# Patient Record
Sex: FEMALE | Race: WHITE | NOT HISPANIC OR LATINO | Employment: FULL TIME | URBAN - METROPOLITAN AREA
[De-identification: names, ages, dates, MRNs, and addresses within clinical notes are randomized per-mention and may not be internally consistent; named-entity substitution may affect disease eponyms.]

---

## 2017-07-31 ENCOUNTER — OFFICE VISIT (OUTPATIENT)
Dept: ORTHOPEDIC SURGERY | Facility: CLINIC | Age: 52
End: 2017-07-31

## 2017-07-31 DIAGNOSIS — M17.0 PRIMARY OSTEOARTHRITIS OF BOTH KNEES: Primary | ICD-10-CM

## 2017-07-31 DIAGNOSIS — R52 PAIN: ICD-10-CM

## 2017-07-31 PROCEDURE — 20610 DRAIN/INJ JOINT/BURSA W/O US: CPT | Performed by: ORTHOPAEDIC SURGERY

## 2017-07-31 PROCEDURE — 99213 OFFICE O/P EST LOW 20 MIN: CPT | Performed by: ORTHOPAEDIC SURGERY

## 2017-07-31 RX ORDER — TRIAMCINOLONE ACETONIDE 40 MG/ML
40 INJECTION, SUSPENSION INTRA-ARTICULAR; INTRAMUSCULAR
Status: COMPLETED | OUTPATIENT
Start: 2017-07-31 | End: 2017-07-31

## 2017-07-31 RX ORDER — LIDOCAINE HYDROCHLORIDE 10 MG/ML
8 INJECTION, SOLUTION EPIDURAL; INFILTRATION; INTRACAUDAL; PERINEURAL
Status: COMPLETED | OUTPATIENT
Start: 2017-07-31 | End: 2017-07-31

## 2017-07-31 RX ADMIN — LIDOCAINE HYDROCHLORIDE 8 ML: 10 INJECTION, SOLUTION EPIDURAL; INFILTRATION; INTRACAUDAL; PERINEURAL at 13:43

## 2017-07-31 RX ADMIN — TRIAMCINOLONE ACETONIDE 40 MG: 40 INJECTION, SUSPENSION INTRA-ARTICULAR; INTRAMUSCULAR at 13:43

## 2017-07-31 NOTE — PROGRESS NOTES
Subjective: Bilateral knee pain     Patient ID: Sharonda Preciado is a 51 y.o. female.    Chief Complaint:    History of Present Illness 51-year-old female returns once again with both knee symptomatic.  Was seen last December which time she underwent bilateral cortisone injections into her knees and did well to the past 4-6 weeks.  She is not complaining of pain with activities of daily living.  Pain getting in and out of a chair and navigating steps and even some just with normal activity.  Patient returns to see if she is a candidate for repeat injections or to be needed proceed with some other form of treatment IV viscus supplement injections.       Social History     Occupational History   • Not on file.     Social History Main Topics   • Smoking status: Never Smoker   • Smokeless tobacco: Not on file   • Alcohol use No   • Drug use: No   • Sexual activity: No      Review of Systems   Constitutional: Negative for chills, diaphoresis, fever and unexpected weight change.   HENT: Negative for hearing loss, nosebleeds, sore throat and tinnitus.    Eyes: Negative for pain and visual disturbance.   Respiratory: Negative for cough, shortness of breath and wheezing.    Cardiovascular: Negative for chest pain and palpitations.   Gastrointestinal: Negative for abdominal pain, diarrhea, nausea and vomiting.   Endocrine: Negative for cold intolerance, heat intolerance and polydipsia.   Genitourinary: Negative for difficulty urinating, dysuria and hematuria.   Musculoskeletal: Positive for arthralgias. Negative for joint swelling and myalgias.   Skin: Negative for rash and wound.   Allergic/Immunologic: Negative for environmental allergies.   Neurological: Negative for dizziness, syncope and numbness.   Hematological: Does not bruise/bleed easily.   Psychiatric/Behavioral: Negative for dysphoric mood and sleep disturbance. The patient is not nervous/anxious.    All other systems reviewed and are negative.        No past medical  history on file.  No past surgical history on file.  No family history on file.      Objective:  There were no vitals filed for this visit.  There were no vitals filed for this visit.  There is no height or weight on file to calculate BMI.       Ortho Exam  she is alert and oriented ×3.  Both knee show no swelling effusion erythema.  There is 0-120° of motion with crepitus throughout the arc of motion but no instability.  Quad function is 5 over 5.  Calves are nontender with negative Homans.  Skin is cool to touch.  She has been taking naproxen twice a day with no GI side effects is not resolving her all of her pain and discomfort.  The pain now again is beginning to interfere with activity daily living.    Assessment:       1. Primary osteoarthritis of both knees    2. Pain          Plan:      as it has been more than 6 months since her last injection on a repeat injections in the both knees.  Sterile prep and underwent injection 8 cc lidocaine 1 cc Kenalog through the superior lateral portal without complications.  Post injection instructions given to the patient.  Return to see me as needed again is a emphasized as listed 5 months we'll proceed with viscus supplement injections in its more than 5-6 months repeat cortisone discussed this with the patient and she is in a great      Work Status:    SEBASTIAN query complete.    Orders:  Orders Placed This Encounter   Procedures   • Large Joint Arthrocentesis       Medications:  No orders of the defined types were placed in this encounter.      Followup:  Return if symptoms worsen or fail to improve.        Large Joint Arthrocentesis  Date/Time: 7/31/2017 1:43 PM  Consent given by: patient  Site marked: site marked  Timeout: Immediately prior to procedure a time out was called to verify the correct patient, procedure, equipment, support staff and site/side marked as required   Supporting Documentation  Indications: pain   Procedure Details  Location: knee - Knee joint:  bilateral.  Preparation: Patient was prepped and draped in the usual sterile fashion  Needle size: 22 G  Medications administered: 8 mL lidocaine PF 1% 1 %; 40 mg triamcinolone acetonide 40 MG/ML  Patient tolerance: patient tolerated the procedure well with no immediate complications        Dragon transcription disclaimer     Much of this encounter note is an electronic transcription/translation of spoken language to printed text. The electronic translation of spoken language may permit erroneous, or at times, nonsensical words or phrases to be inadvertently transcribed. Although I have reviewed the note for such errors, some may still exist.

## 2018-02-14 ENCOUNTER — OFFICE VISIT (OUTPATIENT)
Dept: ORTHOPEDIC SURGERY | Facility: CLINIC | Age: 53
End: 2018-02-14

## 2018-02-14 DIAGNOSIS — M17.0 PRIMARY OSTEOARTHRITIS OF BOTH KNEES: Primary | ICD-10-CM

## 2018-02-14 PROCEDURE — 20610 DRAIN/INJ JOINT/BURSA W/O US: CPT | Performed by: ORTHOPAEDIC SURGERY

## 2018-02-14 PROCEDURE — 99213 OFFICE O/P EST LOW 20 MIN: CPT | Performed by: ORTHOPAEDIC SURGERY

## 2018-02-14 RX ORDER — LIDOCAINE HYDROCHLORIDE 10 MG/ML
8 INJECTION, SOLUTION EPIDURAL; INFILTRATION; INTRACAUDAL; PERINEURAL
Status: COMPLETED | OUTPATIENT
Start: 2018-02-14 | End: 2018-02-14

## 2018-02-14 RX ORDER — TRIAMCINOLONE ACETONIDE 40 MG/ML
40 INJECTION, SUSPENSION INTRA-ARTICULAR; INTRAMUSCULAR
Status: COMPLETED | OUTPATIENT
Start: 2018-02-14 | End: 2018-02-14

## 2018-02-14 RX ORDER — SERTRALINE HYDROCHLORIDE 100 MG/1
100 TABLET, FILM COATED ORAL
COMMUNITY
Start: 2017-07-21 | End: 2018-07-21

## 2018-02-14 RX ADMIN — TRIAMCINOLONE ACETONIDE 40 MG: 40 INJECTION, SUSPENSION INTRA-ARTICULAR; INTRAMUSCULAR at 08:41

## 2018-02-14 RX ADMIN — LIDOCAINE HYDROCHLORIDE 8 ML: 10 INJECTION, SOLUTION EPIDURAL; INFILTRATION; INTRACAUDAL; PERINEURAL at 08:41

## 2018-02-14 NOTE — PROGRESS NOTES
Subjective: Bilateral knee pain     Patient ID: Sharonda Preciado is a 52 y.o. female.    Chief Complaint:    History of Present Illness 52-year-old female is seen once again for bilateral knee pain.  Was seen last July and underwent cortisone injection in both knees and did well for about 5 maybe 6 months.  She is now beginning to have pain and discomfort once again on a daily basis particularly getting in and out of a chair and navigating Even Some Pain at Rest.  Describes the Pain at Rest As a 5 or 6 and with Activity 7 or 8.  She Has Been Tolerating the Naproxen without any GI side effects.       Social History     Occupational History   • Not on file.     Social History Main Topics   • Smoking status: Never Smoker   • Smokeless tobacco: Not on file   • Alcohol use No   • Drug use: No   • Sexual activity: No      Review of Systems   Constitutional: Negative for chills, diaphoresis, fever and unexpected weight change.   HENT: Negative for hearing loss, nosebleeds, sore throat and tinnitus.    Eyes: Negative for pain and visual disturbance.   Respiratory: Negative for cough, shortness of breath and wheezing.    Cardiovascular: Negative for chest pain and palpitations.   Gastrointestinal: Negative for abdominal pain, diarrhea, nausea and vomiting.   Endocrine: Negative for cold intolerance, heat intolerance and polydipsia.   Genitourinary: Negative for difficulty urinating, dysuria and hematuria.   Musculoskeletal: Positive for arthralgias. Negative for joint swelling and myalgias.   Skin: Negative for rash and wound.   Allergic/Immunologic: Negative for environmental allergies.   Neurological: Negative for dizziness, syncope and numbness.   Hematological: Does not bruise/bleed easily.   Psychiatric/Behavioral: Negative for dysphoric mood and sleep disturbance. The patient is not nervous/anxious.          No past medical history on file.  No past surgical history on file.  No family history on  file.      Objective:  There were no vitals filed for this visit.  There were no vitals filed for this visit.  There is no height or weight on file to calculate BMI.       Ortho Exam  she is alert and oriented ×3.  She is classified as a class III obese female BMI 49.  Neither knee has an effusion swelling erythema.  There is moderate crepitus with range of motion which is 0-120° but there is no instability throughout the arc of motion no patella subluxation.  Quad function is 5 over 5 in her calf is nontender negative Homans.  There is no sensory loss no motor deficit with good distal pulses.  The skin is cool to touch.  His been tolerating the naproxen without any GI side effects her discomfort.    Assessment:       1. Primary osteoarthritis of both knees          Plan:      over 10 minutes was spent discussing treatment options at this time.  Tolerating the naproxen without any GI side effects.  She had a good response to cortisone injection that lasted for over 5 months.  At this time discuss repeat cortisone injection versus viscus supplement injections versus physical therapy versus change in the anti-inflammatories.  She had a good response previously the patient wants to proceed with repeat cortisone injection which I do think is viable option.  She underwent therefore bilateral knee injections with 8 cc lidocaine 1 cc Kenalog through the superior lateral portal about sterile prep without complications tolerating it well.  Postinjection instructions given to the patient.  Return to see me as needed and again discussed with the patient timing of return will dictate repeat cortisone possibly or viscus supplement injections.  She was in agreement      Work Status:    SEBASTIAN query complete.    Orders:  Orders Placed This Encounter   Procedures   • Large Joint Arthrocentesis       Medications:  New Medications Ordered This Visit   Medications   • sertraline (ZOLOFT) 100 MG tablet     Sig: Take 100 mg by mouth.        Followup:  Return if symptoms worsen or fail to improve.          Dragon transcription disclaimer     Much of this encounter note is an electronic transcription/translation of spoken language to printed text. The electronic translation of spoken language may permit erroneous, or at times, nonsensical words or phrases to be inadvertently transcribed. Although I have reviewed the note for such errors, some may still exist.  Large Joint Arthrocentesis  Date/Time: 2/14/2018 8:41 AM  Consent given by: patient  Site marked: site marked  Timeout: Immediately prior to procedure a time out was called to verify the correct patient, procedure, equipment, support staff and site/side marked as required   Supporting Documentation  Indications: pain   Procedure Details  Location: knee - Knee joint: bilateral.  Preparation: Patient was prepped and draped in the usual sterile fashion  Needle size: 22 G  Approach: anterolateral  Medications administered: 8 mL lidocaine PF 1% 1 %; 40 mg triamcinolone acetonide 40 MG/ML  Patient tolerance: patient tolerated the procedure well with no immediate complications

## 2018-07-26 ENCOUNTER — OFFICE VISIT (OUTPATIENT)
Dept: ORTHOPEDIC SURGERY | Facility: CLINIC | Age: 53
End: 2018-07-26

## 2018-07-26 DIAGNOSIS — R52 PAIN: Primary | ICD-10-CM

## 2018-07-26 DIAGNOSIS — M17.0 PRIMARY OSTEOARTHRITIS OF BOTH KNEES: ICD-10-CM

## 2018-07-26 PROCEDURE — 20610 DRAIN/INJ JOINT/BURSA W/O US: CPT | Performed by: ORTHOPAEDIC SURGERY

## 2018-07-26 RX ORDER — TRIAMCINOLONE ACETONIDE 40 MG/ML
40 INJECTION, SUSPENSION INTRA-ARTICULAR; INTRAMUSCULAR
Status: COMPLETED | OUTPATIENT
Start: 2018-07-26 | End: 2018-07-26

## 2018-07-26 RX ORDER — LIDOCAINE HYDROCHLORIDE 10 MG/ML
4 INJECTION, SOLUTION EPIDURAL; INFILTRATION; INTRACAUDAL; PERINEURAL
Status: COMPLETED | OUTPATIENT
Start: 2018-07-26 | End: 2018-07-26

## 2018-07-26 RX ADMIN — TRIAMCINOLONE ACETONIDE 40 MG: 40 INJECTION, SUSPENSION INTRA-ARTICULAR; INTRAMUSCULAR at 14:57

## 2018-07-26 RX ADMIN — LIDOCAINE HYDROCHLORIDE 4 ML: 10 INJECTION, SOLUTION EPIDURAL; INFILTRATION; INTRACAUDAL; PERINEURAL at 14:57

## 2018-07-26 NOTE — PROGRESS NOTES
Subjective: Bilateral knee pain     Patient ID: Sharonda Preciado is a 52 y.o. female.    Chief Complaint:    History of Present Illness patient seen once again with bilateral knee pain.  Cortisone injection lasted for about 3-3-1/2 months       Social History     Occupational History   • Not on file.     Social History Main Topics   • Smoking status: Never Smoker   • Smokeless tobacco: Not on file   • Alcohol use No   • Drug use: No   • Sexual activity: No      Review of Systems   Constitutional: Negative for chills, diaphoresis, fever and unexpected weight change.   HENT: Negative for hearing loss, nosebleeds, sore throat and tinnitus.    Eyes: Negative for pain and visual disturbance.   Respiratory: Negative for cough, shortness of breath and wheezing.    Cardiovascular: Negative for chest pain and palpitations.   Gastrointestinal: Negative for abdominal pain, diarrhea, nausea and vomiting.   Endocrine: Negative for cold intolerance, heat intolerance and polydipsia.   Genitourinary: Negative for difficulty urinating, dysuria and hematuria.   Musculoskeletal: Positive for arthralgias. Negative for joint swelling and myalgias.   Skin: Negative for rash and wound.   Allergic/Immunologic: Negative for environmental allergies.   Neurological: Negative for dizziness, syncope and numbness.   Hematological: Does not bruise/bleed easily.   Psychiatric/Behavioral: Negative for dysphoric mood and sleep disturbance. The patient is not nervous/anxious.          No past medical history on file.  No past surgical history on file.  No family history on file.      Objective:  There were no vitals filed for this visit.  There were no vitals filed for this visit.  There is no height or weight on file to calculate BMI.       Ortho Exam  she is alert and oriented ×3.  Again marked pain and discomfort with range of motion but no instability.    Assessment:       1. Pain    2. Primary osteoarthritis of both knees          Plan: Discussed  treatment options patient as far as repeat cortisone injection or proceed with viscus supplement injections.  She is getting ready to go on a trip that she like to have cortisone shot today and then in in September October proceed with viscus supplement injections.  Therefore both knees were injected 4 cc lidocaine 1 cc Kenalog through the superolateral portal about sterile prep without complications tolerating that well.  Return in September October to begin the viscus supplement injections release to begin the authorization process      Large Joint Arthrocentesis  Date/Time: 7/26/2018 2:57 PM  Consent given by: patient  Site marked: site marked  Timeout: Immediately prior to procedure a time out was called to verify the correct patient, procedure, equipment, support staff and site/side marked as required   Supporting Documentation  Indications: pain   Procedure Details  Location: knee - Knee joint: bilateral.  Preparation: Patient was prepped and draped in the usual sterile fashion  Needle size: 22 G  Approach: anteromedial  Medications administered: 4 mL lidocaine PF 1% 1 %; 40 mg triamcinolone acetonide 40 MG/ML  Patient tolerance: patient tolerated the procedure well with no immediate complications              Work Status:    SEBASTIAN query complete.    Orders:  Orders Placed This Encounter   Procedures   • Large Joint Arthrocentesis       Medications:  No orders of the defined types were placed in this encounter.      Followup:  Return in about 3 months (around 10/26/2018).          Dragon transcription disclaimer     Much of this encounter note is an electronic transcription/translation of spoken language to printed text. The electronic translation of spoken language may permit erroneous, or at times, nonsensical words or phrases to be inadvertently transcribed. Although I have reviewed the note for such errors, some may still exist.

## 2018-10-10 ENCOUNTER — OFFICE VISIT (OUTPATIENT)
Dept: ORTHOPEDIC SURGERY | Facility: CLINIC | Age: 53
End: 2018-10-10

## 2018-10-10 VITALS — WEIGHT: 260 LBS | HEIGHT: 62 IN | BODY MASS INDEX: 47.84 KG/M2

## 2018-10-10 DIAGNOSIS — M17.0 PRIMARY OSTEOARTHRITIS OF BOTH KNEES: Primary | ICD-10-CM

## 2018-10-10 PROCEDURE — 99212 OFFICE O/P EST SF 10 MIN: CPT | Performed by: ORTHOPAEDIC SURGERY

## 2018-10-10 NOTE — PROGRESS NOTES
Subjective: Knee pain     Patient ID: Sharonda Preciado is a 52 y.o. female.    Chief Complaint:    History of Present Illness 52-year-old female referred knees once again symptomatic.  The last series of cortisone injection lasted only for a couple months she is again having moderate pain and discomfort all activities of daily living.  Anti-inflammatories also ineffective as far as relieving the patient of discomfort       Social History     Occupational History   • Not on file.     Social History Main Topics   • Smoking status: Never Smoker   • Smokeless tobacco: Never Used   • Alcohol use No   • Drug use: No   • Sexual activity: No      Review of Systems   Constitutional: Negative for chills, diaphoresis, fever and unexpected weight change.   HENT: Negative for hearing loss, nosebleeds, sore throat and tinnitus.    Eyes: Negative for pain and visual disturbance.   Respiratory: Negative for cough, shortness of breath and wheezing.    Cardiovascular: Negative for chest pain and palpitations.   Gastrointestinal: Negative for abdominal pain, diarrhea, nausea and vomiting.   Endocrine: Negative for cold intolerance, heat intolerance and polydipsia.   Genitourinary: Negative for difficulty urinating, dysuria and hematuria.   Musculoskeletal: Positive for arthralgias and myalgias. Negative for joint swelling.   Skin: Negative for rash and wound.   Allergic/Immunologic: Negative for environmental allergies.   Neurological: Negative for dizziness, syncope and numbness.   Hematological: Does not bruise/bleed easily.   Psychiatric/Behavioral: Negative for dysphoric mood and sleep disturbance. The patient is not nervous/anxious.          History reviewed. No pertinent past medical history.  History reviewed. No pertinent surgical history.  History reviewed. No pertinent family history.      Objective:  There were no vitals filed for this visit.  1    10/10/18  0952   Weight: 118 kg (260 lb)     Body mass index is 47.55  kg/m².        Ortho Exam   is alert and oriented ×3.  Neither knee shows no swelling effusion erythema.  She has good distal pulses no motor sensory deficit.  There is pain and crepitus with range of motion but there is no instability.  Quad function 5 over 5 in the calf remains nontender    Assessment:        1. Primary osteoarthritis of both knees           Plan: As cortisone is no longer effective for her to contact insurance carrier regarding viscus supplement injections.  Discussed the rationale behind the use of the gel injections.  Return to begin those once authorization has been obtained            Work Status:    SEBASTIAN query complete.    Orders:  Orders Placed This Encounter   Procedures   • Visco Treatment       Medications:  No orders of the defined types were placed in this encounter.      Followup:  Return in about 2 weeks (around 10/24/2018).          Dictated utilizing Dragon dictation

## 2018-11-08 ENCOUNTER — TELEPHONE (OUTPATIENT)
Dept: ORTHOPEDIC SURGERY | Facility: CLINIC | Age: 53
End: 2018-11-08

## 2018-11-16 ENCOUNTER — TELEPHONE (OUTPATIENT)
Dept: ORTHOPEDIC SURGERY | Facility: CLINIC | Age: 53
End: 2018-11-16

## 2018-11-16 NOTE — TELEPHONE ENCOUNTER
LEFT MESSAGE FOR PATIENT TO CALL BACK.    THE RX WAS FAXED TO [facility] ON 11.7.18    I AM NOT AWARE OF WHICH SPECIALTY PHARMACY HER INSURANCE IS USING, DUE TO HER INSURANCE BEING [facility].     I FAXED HER VISCO INFO TO [facility]'S FAX NUMBER.     IF PATIENT WOULD LIKE TO CALL [facility], SHE WOULD JUST NEED TO CALL HER INSURANCE AND GET TRANSFERRED TO THE PHARMACY.     11.29.18  Tried to call patient to set up bilateral orthovisc.     No copay, needs to come in before january

## 2018-11-16 NOTE — TELEPHONE ENCOUNTER
Patient is calling wanting to schedule for  visco injections in juan knee. Your note in the chart said Authorization was approved and was faxed along with prescription to pharmacy on 11/08/18. I let patient know.  Can you check which pharmacy and call patient with contact number? She would like to call them to consent for shipping.

## 2018-12-03 ENCOUNTER — CLINICAL SUPPORT (OUTPATIENT)
Dept: ORTHOPEDIC SURGERY | Facility: CLINIC | Age: 53
End: 2018-12-03

## 2018-12-03 VITALS — BODY MASS INDEX: 46.01 KG/M2 | WEIGHT: 250 LBS | HEIGHT: 62 IN

## 2018-12-03 DIAGNOSIS — M17.0 PRIMARY OSTEOARTHRITIS OF BOTH KNEES: Primary | ICD-10-CM

## 2018-12-03 PROCEDURE — 20610 DRAIN/INJ JOINT/BURSA W/O US: CPT | Performed by: ORTHOPAEDIC SURGERY

## 2018-12-03 NOTE — PROGRESS NOTES
Subjective: Osteoarthritis both knees     Patient ID: Sharonda Preciado is a 53 y.o. female.    Chief Complaint:    History of Present Illness patient seen for first Orthovisc injection into both knees       Social History     Occupational History   • Not on file   Tobacco Use   • Smoking status: Never Smoker   • Smokeless tobacco: Never Used   Substance and Sexual Activity   • Alcohol use: No   • Drug use: No   • Sexual activity: No      Review of Systems   Constitutional: Negative for chills, diaphoresis, fever and unexpected weight change.   HENT: Negative for hearing loss, nosebleeds, sore throat and tinnitus.    Eyes: Negative for pain and visual disturbance.   Respiratory: Negative for cough, shortness of breath and wheezing.    Cardiovascular: Negative for chest pain and palpitations.   Gastrointestinal: Negative for abdominal pain, diarrhea, nausea and vomiting.   Endocrine: Negative for cold intolerance, heat intolerance and polydipsia.   Genitourinary: Negative for difficulty urinating, dysuria and hematuria.   Musculoskeletal: Positive for arthralgias and myalgias. Negative for joint swelling.   Skin: Negative for rash and wound.   Allergic/Immunologic: Negative for environmental allergies.   Neurological: Negative for dizziness, syncope and numbness.   Hematological: Does not bruise/bleed easily.   Psychiatric/Behavioral: Negative for dysphoric mood and sleep disturbance. The patient is not nervous/anxious.          History reviewed. No pertinent past medical history.  History reviewed. No pertinent surgical history.  History reviewed. No pertinent family history.      Objective:  There were no vitals filed for this visit.      12/03/18  0818   Weight: 113 kg (250 lb)     Body mass index is 45.73 kg/m².        Ortho Exam   2 ML injection given the each knee through the superolateral portal about sterile prep without, complications tolerating it well.  Postinjection instructions given to the  patient.    Assessment:        1. Primary osteoarthritis of both knees           Plan: Return next week for her next injection  Large Joint Arthrocentesis  Date/Time: 12/3/2018 8:19 AM  Consent given by: patient  Site marked: site marked  Timeout: Immediately prior to procedure a time out was called to verify the correct patient, procedure, equipment, support staff and site/side marked as required   Supporting Documentation  Indications: pain   Procedure Details  Location: knee - Knee joint: BILATERAL KNEE.  Preparation: Patient was prepped and draped in the usual sterile fashion  Needle size: 22 G  Approach: anterolateral  Medications administered: 30 mg Hyaluronan 30 MG/2ML  Patient tolerance: patient tolerated the procedure well with no immediate complications      BILATERAL ORTHOVISC PROVIDED VIA [facility].          Work Status:    SEBASTIAN query complete.    Orders:  Orders Placed This Encounter   Procedures   • Large Joint Arthrocentesis       Medications:  No orders of the defined types were placed in this encounter.      Followup:  Return in about 1 week (around 12/10/2018).          Dictated utilizing Dragon dictation

## 2018-12-10 ENCOUNTER — CLINICAL SUPPORT (OUTPATIENT)
Dept: ORTHOPEDIC SURGERY | Facility: CLINIC | Age: 53
End: 2018-12-10

## 2018-12-10 DIAGNOSIS — M17.0 PRIMARY OSTEOARTHRITIS OF BOTH KNEES: ICD-10-CM

## 2018-12-10 DIAGNOSIS — R52 PAIN: Primary | ICD-10-CM

## 2018-12-10 PROCEDURE — 20610 DRAIN/INJ JOINT/BURSA W/O US: CPT | Performed by: ORTHOPAEDIC SURGERY

## 2018-12-10 NOTE — PROGRESS NOTES
Subjective: Osteoarthritis both knees     Patient ID: Sharonda Preciado is a 53 y.o. female.    Chief Complaint:    History of Present Illness patient seen for second Orthovisc injection.  Has noted slight improvement following the first injection       Social History     Occupational History   • Not on file   Tobacco Use   • Smoking status: Never Smoker   • Smokeless tobacco: Never Used   Substance and Sexual Activity   • Alcohol use: No   • Drug use: No   • Sexual activity: No      Review of Systems   Constitutional: Negative for chills, diaphoresis, fever and unexpected weight change.   HENT: Negative for hearing loss, nosebleeds, sore throat and tinnitus.    Eyes: Negative for pain and visual disturbance.   Respiratory: Negative for cough, shortness of breath and wheezing.    Cardiovascular: Negative for chest pain and palpitations.   Gastrointestinal: Negative for abdominal pain, diarrhea, nausea and vomiting.   Endocrine: Negative for cold intolerance, heat intolerance and polydipsia.   Genitourinary: Negative for difficulty urinating, dysuria and hematuria.   Musculoskeletal: Positive for arthralgias. Negative for joint swelling and myalgias.   Skin: Negative for rash and wound.   Allergic/Immunologic: Negative for environmental allergies.   Neurological: Negative for dizziness, syncope and numbness.   Hematological: Does not bruise/bleed easily.   Psychiatric/Behavioral: Negative for dysphoric mood and sleep disturbance. The patient is not nervous/anxious.    All other systems reviewed and are negative.        No past medical history on file.  No past surgical history on file.  No family history on file.      Objective:  There were no vitals filed for this visit.  There were no vitals filed for this visit.  There is no height or weight on file to calculate BMI.        Ortho Exam   2 ML injection given in each portal about sterile prep without complications tolerating that well.  Postinjection instructions given  to the patient     Assessment:        1. Pain    2. Primary osteoarthritis of both knees           Plan: Return next week for final injection      Large Joint Arthrocentesis  Date/Time: 12/10/2018 8:22 AM  Consent given by: patient  Site marked: site marked  Timeout: Immediately prior to procedure a time out was called to verify the correct patient, procedure, equipment, support staff and site/side marked as required   Supporting Documentation  Indications: pain   Procedure Details  Location: knee - Knee joint: BILATERAL.  Preparation: Patient was prepped and draped in the usual sterile fashion  Needle size: 22 G  Approach: anterolateral  Medications administered: 30 mg Hyaluronan 30 MG/2ML  Patient tolerance: patient tolerated the procedure well with no immediate complications            Work Status:    SEBASTIAN query complete.    Orders:  Orders Placed This Encounter   Procedures   • Large Joint Arthrocentesis       Medications:  No orders of the defined types were placed in this encounter.      Followup:  Return in about 1 week (around 12/17/2018).          Dictated utilizing Dragon dictation

## 2018-12-17 ENCOUNTER — CLINICAL SUPPORT (OUTPATIENT)
Dept: ORTHOPEDIC SURGERY | Facility: CLINIC | Age: 53
End: 2018-12-17

## 2018-12-17 VITALS — HEIGHT: 62 IN | WEIGHT: 250 LBS | BODY MASS INDEX: 46.01 KG/M2

## 2018-12-17 DIAGNOSIS — M17.0 PRIMARY OSTEOARTHRITIS OF BOTH KNEES: Primary | ICD-10-CM

## 2018-12-17 PROCEDURE — 20610 DRAIN/INJ JOINT/BURSA W/O US: CPT | Performed by: ORTHOPAEDIC SURGERY

## 2018-12-17 NOTE — PROGRESS NOTES
Subjective: Osteoarthritis both knees     Patient ID: Sharonda Preciado is a 53 y.o. female.    Chief Complaint:    History of Present Illness  Patient seen for final Orthovisc injection into both knees     Social History     Occupational History   • Not on file   Tobacco Use   • Smoking status: Never Smoker   • Smokeless tobacco: Never Used   Substance and Sexual Activity   • Alcohol use: No   • Drug use: No   • Sexual activity: No      Review of Systems   Constitutional: Negative for appetite change, chills, diaphoresis, fever and unexpected weight change.   HENT: Negative for hearing loss, nosebleeds, sore throat and tinnitus.    Eyes: Negative for pain and visual disturbance.   Respiratory: Negative for cough, shortness of breath and wheezing.    Cardiovascular: Negative for chest pain and palpitations.   Gastrointestinal: Negative for abdominal pain, diarrhea, nausea and vomiting.   Endocrine: Negative for cold intolerance, heat intolerance and polydipsia.   Genitourinary: Negative for difficulty urinating, dysuria and hematuria.   Musculoskeletal: Positive for arthralgias and myalgias. Negative for joint swelling.   Skin: Negative for rash and wound.   Allergic/Immunologic: Negative for environmental allergies.   Neurological: Negative for dizziness, syncope and numbness.   Hematological: Does not bruise/bleed easily.   Psychiatric/Behavioral: Negative for dysphoric mood and sleep disturbance. The patient is not nervous/anxious.          History reviewed. No pertinent past medical history.  History reviewed. No pertinent surgical history.  History reviewed. No pertinent family history.      Objective:  There were no vitals filed for this visit.      12/17/18  0850   Weight: 113 kg (250 lb)     Body mass index is 45.73 kg/m².        Ortho Exam   final 2 ML injection given in the each knee through the superolateral portal sterile prep without, complications tolerating well.  Postinjection instructions given to the  patient.    Assessment:        1. Primary osteoarthritis of both knees           Plan: Return in 4 weeks if still symptomatic otherwise when necessary  Large Joint Arthrocentesis  Date/Time: 12/17/2018 8:51 AM  Consent given by: patient  Site marked: site marked  Timeout: Immediately prior to procedure a time out was called to verify the correct patient, procedure, equipment, support staff and site/side marked as required   Supporting Documentation  Indications: pain   Procedure Details  Location: knee - Knee joint: BILATERAL KNEE.  Preparation: Patient was prepped and draped in the usual sterile fashion  Needle size: 22 G  Approach: anterolateral  Medications administered: 30 mg Hyaluronan 30 MG/2ML  Patient tolerance: patient tolerated the procedure well with no immediate complications      BILATERAL ORTHOVISC PROVIDED VIA [facility].          Work Status:    SEBASTIAN query complete.    Orders:  Orders Placed This Encounter   Procedures   • Large Joint Arthrocentesis       Medications:  No orders of the defined types were placed in this encounter.      Followup:  Return in about 4 weeks (around 1/14/2019).          Dictated utilizing Dragon dictation

## 2019-04-04 ENCOUNTER — OFFICE VISIT (OUTPATIENT)
Dept: ORTHOPEDIC SURGERY | Facility: CLINIC | Age: 54
End: 2019-04-04

## 2019-04-04 VITALS — WEIGHT: 253.2 LBS | BODY MASS INDEX: 46.31 KG/M2

## 2019-04-04 DIAGNOSIS — E66.01 MORBID OBESITY WITH BMI OF 45.0-49.9, ADULT (HCC): ICD-10-CM

## 2019-04-04 DIAGNOSIS — R52 PAIN: Primary | ICD-10-CM

## 2019-04-04 DIAGNOSIS — M17.0 PRIMARY OSTEOARTHRITIS OF BOTH KNEES: ICD-10-CM

## 2019-04-04 PROCEDURE — 20610 DRAIN/INJ JOINT/BURSA W/O US: CPT | Performed by: ORTHOPAEDIC SURGERY

## 2019-04-04 PROCEDURE — 99214 OFFICE O/P EST MOD 30 MIN: CPT | Performed by: ORTHOPAEDIC SURGERY

## 2019-04-04 RX ORDER — LIDOCAINE HYDROCHLORIDE 10 MG/ML
4 INJECTION, SOLUTION EPIDURAL; INFILTRATION; INTRACAUDAL; PERINEURAL
Status: COMPLETED | OUTPATIENT
Start: 2019-04-04 | End: 2019-04-04

## 2019-04-04 RX ORDER — TRIAMCINOLONE ACETONIDE 40 MG/ML
40 INJECTION, SUSPENSION INTRA-ARTICULAR; INTRAMUSCULAR
Status: COMPLETED | OUTPATIENT
Start: 2019-04-04 | End: 2019-04-04

## 2019-04-04 RX ORDER — SERTRALINE HYDROCHLORIDE 100 MG/1
TABLET, FILM COATED ORAL
COMMUNITY
Start: 2019-03-11 | End: 2020-11-18

## 2019-04-04 RX ORDER — LEVOTHYROXINE SODIUM 0.12 MG/1
TABLET ORAL
COMMUNITY
Start: 2019-03-11 | End: 2023-03-09 | Stop reason: SDUPTHER

## 2019-04-04 RX ADMIN — LIDOCAINE HYDROCHLORIDE 4 ML: 10 INJECTION, SOLUTION EPIDURAL; INFILTRATION; INTRACAUDAL; PERINEURAL at 08:18

## 2019-04-04 RX ADMIN — TRIAMCINOLONE ACETONIDE 40 MG: 40 INJECTION, SUSPENSION INTRA-ARTICULAR; INTRAMUSCULAR at 08:18

## 2019-04-04 NOTE — PROGRESS NOTES
Subjective: Bilateral knee pain, morbid obesity     Patient ID: Sharonda Preciado is a 53 y.o. female.    Chief Complaint:    History of Present Illness 53-year-old female returns after having completed the viscous supplement injections in December.  Noted just marginal relief once again having significant pain and discomfort in both knees presents for further evaluation.  Patient is failed to respond to anti-inflammatories cortisone injection and Visco supplement injections and she wishes to discuss total joint replacement.       Social History     Occupational History   • Not on file   Tobacco Use   • Smoking status: Never Smoker   • Smokeless tobacco: Never Used   Substance and Sexual Activity   • Alcohol use: No   • Drug use: No   • Sexual activity: No      Review of Systems   Constitutional: Negative for chills, diaphoresis, fever and unexpected weight change.   HENT: Negative for hearing loss, nosebleeds, sore throat and tinnitus.    Eyes: Negative for pain and visual disturbance.   Respiratory: Negative for cough, shortness of breath and wheezing.    Cardiovascular: Negative for chest pain and palpitations.   Gastrointestinal: Negative for abdominal pain, diarrhea, nausea and vomiting.   Endocrine: Negative for cold intolerance, heat intolerance and polydipsia.   Genitourinary: Negative for difficulty urinating, dysuria and hematuria.   Musculoskeletal: Positive for arthralgias. Negative for joint swelling and myalgias.   Skin: Negative for rash and wound.   Allergic/Immunologic: Negative for environmental allergies.   Neurological: Negative for dizziness, syncope and numbness.   Hematological: Does not bruise/bleed easily.   Psychiatric/Behavioral: Negative for dysphoric mood and sleep disturbance. The patient is not nervous/anxious.    All other systems reviewed and are negative.        No past medical history on file.  No past surgical history on file.  No family history on file.      Objective:  There were no  vitals filed for this visit.      04/04/19  0813   Weight: 115 kg (253 lb 3.2 oz)     Body mass index is 46.31 kg/m².        Ortho Exam   She is alert and oriented x3.  Neither knee shows any swelling effusion erythema but there is crepitus with range of motion to both knees but no instability.  Joint line tenderness but negative Darrell's.  Quad function 5/5.  No varus or valgus instability at 0 30 degrees no instability at 0 90 degrees.  Skin is cool to touch.  Calf is nontender.  Good distal pulses no motor sensory deficit.  Patient was we weighed and her weight now is 253 and her BMI is increased from 45-46.3.    Assessment:        1. Pain    2. Primary osteoarthritis of both knees    3. Morbid obesity with BMI of 45.0-49.9, adult (CMS/[facility])           Plan: Over 15 minutes was spent with the patient discussing her treatment going forward.  As far as the pain she is experiencing now we can repeat a cortisone injection to try to get some temporary relief she therefore underwent injection both knees with 4 cc lidocaine 1 cc of Kenalog to the superior lateral portal tolerating well postinjection instructions given to the patient.  Over half the visit was spent discussing weight reduction.  Discussed the new protocols per [facility] that joint replacement and cannot be carried out with the patient BMI over 40.  BMI is now 46.3.  Lengthy discussion with the patient regarding the need to get her BMI down to 40 before surgery can be completed discussed the rationale as far as increased risk of complications and failures of the need for revision surgery.  She has an appointment her primary care physician she states in the next week and she is will discuss with her weight reduction and varus diet plans medications or even a referral to bariatric surgeon.  Patient is going to return 1 June to reevaluate as far as her weight to determine if she is a candidate for surgery or close to being a candidate for surgery  with scars given her BMI close to 40.  Return to see me 1 June and at that time she is to be re-weight  Large Joint Arthrocentesis  Date/Time: 4/4/2019 8:18 AM  Consent given by: patient  Site marked: site marked  Timeout: Immediately prior to procedure a time out was called to verify the correct patient, procedure, equipment, support staff and site/side marked as required   Supporting Documentation  Indications: pain   Procedure Details  Location: knee - Knee joint: bilateral.  Needle size: 22 G  Approach: superior  Medications administered: 40 mg triamcinolone acetonide 40 MG/ML; 4 mL lidocaine PF 1% 1 %  Patient tolerance: patient tolerated the procedure well with no immediate complications                Work Status:    SEBASTIAN query complete.    Orders:  Orders Placed This Encounter   Procedures   • Large Joint Arthrocentesis       Medications:  No orders of the defined types were placed in this encounter.      Followup:  Return in about 2 months (around 6/4/2019).          Dictated utilizing Dragon dictation

## 2019-05-02 ENCOUNTER — TELEPHONE (OUTPATIENT)
Dept: ORTHOPEDIC SURGERY | Facility: CLINIC | Age: 54
End: 2019-05-02

## 2019-05-02 RX ORDER — METHYLPREDNISOLONE 4 MG/1
TABLET ORAL
Qty: 21 TABLET | Refills: 0 | Status: SHIPPED | OUTPATIENT
Start: 2019-05-02 | End: 2019-10-03 | Stop reason: SDUPTHER

## 2019-05-02 NOTE — TELEPHONE ENCOUNTER
Looks like she had bilat knee steriod injs 4/4/2019... So forwarding to you. Please advise.       ----- Message -----   From: Sharonda Preciado   Sent: 5/2/2019  10:43 AM   To: Helio Torres [facility] [employer]   Subject: Appointment Request                                ----- Message from Mychart, Generic sent at 5/2/2019 10:43 AM EDT -----      Appointment Request From: Sharonda Preciado      With Provider: Richar Gerber MD [[facility]]      Preferred Date Range: 5/3/2019 - 5/8/2019      Preferred Times: Any time      Reason for visit: Problem Follow-Up Visit      Comments:   Preferably in the am. I go to work at 1030 but i can make any time work.  I am hoping to get knee injections -steroids.  We leave for a vacation on wed afternoon.  If it’s too soon for another injection I am hoping that Dr Gerber would consider calling in a prescription for  a Medrol dose rd.  Thanks!

## 2019-10-03 ENCOUNTER — OFFICE VISIT (OUTPATIENT)
Dept: ORTHOPEDIC SURGERY | Facility: CLINIC | Age: 54
End: 2019-10-03

## 2019-10-03 VITALS — BODY MASS INDEX: 42.6 KG/M2 | HEIGHT: 62 IN | WEIGHT: 231.5 LBS

## 2019-10-03 DIAGNOSIS — R52 PAIN: Primary | ICD-10-CM

## 2019-10-03 DIAGNOSIS — M17.0 PRIMARY OSTEOARTHRITIS OF BOTH KNEES: ICD-10-CM

## 2019-10-03 PROCEDURE — 99212 OFFICE O/P EST SF 10 MIN: CPT | Performed by: ORTHOPAEDIC SURGERY

## 2019-10-03 RX ORDER — METHYLPREDNISOLONE 4 MG/1
TABLET ORAL
Qty: 21 TABLET | Refills: 0 | Status: SHIPPED | OUTPATIENT
Start: 2019-10-03 | End: 2020-07-29 | Stop reason: ALTCHOICE

## 2019-12-23 ENCOUNTER — OFFICE VISIT (OUTPATIENT)
Dept: ORTHOPEDIC SURGERY | Facility: CLINIC | Age: 54
End: 2019-12-23

## 2019-12-23 DIAGNOSIS — R52 PAIN: Primary | ICD-10-CM

## 2019-12-23 DIAGNOSIS — E66.01 MORBID OBESITY WITH BMI OF 45.0-49.9, ADULT (HCC): ICD-10-CM

## 2019-12-23 DIAGNOSIS — M17.0 PRIMARY OSTEOARTHRITIS OF BOTH KNEES: ICD-10-CM

## 2019-12-23 PROCEDURE — 73562 X-RAY EXAM OF KNEE 3: CPT | Performed by: ORTHOPAEDIC SURGERY

## 2019-12-23 PROCEDURE — 20610 DRAIN/INJ JOINT/BURSA W/O US: CPT | Performed by: ORTHOPAEDIC SURGERY

## 2019-12-23 RX ORDER — TRIAMCINOLONE ACETONIDE 40 MG/ML
40 INJECTION, SUSPENSION INTRA-ARTICULAR; INTRAMUSCULAR
Status: COMPLETED | OUTPATIENT
Start: 2019-12-23 | End: 2019-12-23

## 2019-12-23 RX ORDER — LIDOCAINE HYDROCHLORIDE 10 MG/ML
4 INJECTION, SOLUTION EPIDURAL; INFILTRATION; INTRACAUDAL; PERINEURAL
Status: COMPLETED | OUTPATIENT
Start: 2019-12-23 | End: 2019-12-23

## 2019-12-23 RX ADMIN — TRIAMCINOLONE ACETONIDE 40 MG: 40 INJECTION, SUSPENSION INTRA-ARTICULAR; INTRAMUSCULAR at 10:24

## 2019-12-23 RX ADMIN — LIDOCAINE HYDROCHLORIDE 4 ML: 10 INJECTION, SOLUTION EPIDURAL; INFILTRATION; INTRACAUDAL; PERINEURAL at 10:24

## 2019-12-23 NOTE — PROGRESS NOTES
Subjective: Osteoarthritis both knees     Patient ID: Sharonda Preciado is a 54 y.o. female.    Chief Complaint:    History of Present Illness patient terms of both knees symptomatic.  She was going to schedule surgery on her knees but she has a new job in a new position and cannot do anything and at least until late spring.  She presents today for possible cortisone injections.       Social History     Occupational History   • Not on file   Tobacco Use   • Smoking status: Never Smoker   • Smokeless tobacco: Never Used   Substance and Sexual Activity   • Alcohol use: No   • Drug use: No   • Sexual activity: Never      Review of Systems   Constitutional: Negative for chills, diaphoresis, fever and unexpected weight change.   HENT: Negative for hearing loss, nosebleeds, sore throat and tinnitus.    Eyes: Negative for pain and visual disturbance.   Respiratory: Negative for cough, shortness of breath and wheezing.    Cardiovascular: Negative for chest pain and palpitations.   Gastrointestinal: Negative for abdominal pain, diarrhea, nausea and vomiting.   Endocrine: Negative for cold intolerance, heat intolerance and polydipsia.   Genitourinary: Negative for difficulty urinating, dysuria and hematuria.   Musculoskeletal: Positive for arthralgias. Negative for joint swelling and myalgias.   Skin: Negative for rash and wound.   Allergic/Immunologic: Negative for environmental allergies.   Neurological: Negative for dizziness, syncope and numbness.   Hematological: Does not bruise/bleed easily.   Psychiatric/Behavioral: Negative for dysphoric mood and sleep disturbance. The patient is not nervous/anxious.          No past medical history on file.  No past surgical history on file.  No family history on file.      Objective:  There were no vitals filed for this visit.  There were no vitals filed for this visit.  There is no height or weight on file to calculate BMI.        Ortho Exam   Updated x-rays AP lateral sunrise view of  both knees shows end-stage degenerative arthritis with osteophytes and spurring in all 3 compartments.  There is marked pain and crepitus with range of motion.  There is no instability.  Good distal pulses no motor or sensory deficit.    Assessment:        1. Pain    2. Morbid obesity with BMI of 45.0-49.9, adult (CMS/[facility])    3. Primary osteoarthritis of both knees           Plan: Both knees were injected with 4 cc lidocaine 1 cc Kenalog to the superior lateral portal tolerated well.  Postinjection instructions given to the patient.  She will return in the spring for possible scheduling.  Patient understands her BMI needs to be at 40 or less before we can proceed with scheduling of the surgery.  Answered all questions  Large Joint Arthrocentesis  Date/Time: 12/23/2019 10:24 AM  Consent given by: patient  Site marked: site marked  Timeout: Immediately prior to procedure a time out was called to verify the correct patient, procedure, equipment, support staff and site/side marked as required   Supporting Documentation  Indications: pain   Procedure Details  Location: knee - Knee joint: BILATERAL KNEE.  Preparation: Patient was prepped and draped in the usual sterile fashion  Needle size: 22 G  Approach: superior (LATERAL)  Medications administered: 4 mL lidocaine PF 1% 1 %; 40 mg triamcinolone acetonide 40 MG/ML  Patient tolerance: patient tolerated the procedure well with no immediate complications                  Work Status:    SEBASTIAN query complete.    Orders:  Orders Placed This Encounter   Procedures   • Large Joint Arthrocentesis   • XR Knee 3 View Bilateral       Medications:  No orders of the defined types were placed in this encounter.      Followup:  Return in about 4 months (around 4/23/2020).          Dictated utilizing Dragon dictation

## 2020-07-29 ENCOUNTER — OFFICE VISIT (OUTPATIENT)
Dept: ORTHOPEDIC SURGERY | Facility: CLINIC | Age: 55
End: 2020-07-29

## 2020-07-29 VITALS — HEIGHT: 62 IN | BODY MASS INDEX: 43.98 KG/M2 | WEIGHT: 239 LBS

## 2020-07-29 DIAGNOSIS — M17.0 PRIMARY OSTEOARTHRITIS OF BOTH KNEES: ICD-10-CM

## 2020-07-29 DIAGNOSIS — R52 PAIN: Primary | ICD-10-CM

## 2020-07-29 DIAGNOSIS — E66.01 MORBID OBESITY WITH BMI OF 45.0-49.9, ADULT (HCC): ICD-10-CM

## 2020-07-29 PROCEDURE — 20610 DRAIN/INJ JOINT/BURSA W/O US: CPT | Performed by: ORTHOPAEDIC SURGERY

## 2020-07-29 RX ORDER — TRIAMCINOLONE ACETONIDE 40 MG/ML
40 INJECTION, SUSPENSION INTRA-ARTICULAR; INTRAMUSCULAR
Status: COMPLETED | OUTPATIENT
Start: 2020-07-29 | End: 2020-07-29

## 2020-07-29 RX ORDER — TRIAMTERENE AND HYDROCHLOROTHIAZIDE 37.5; 25 MG/1; MG/1
1 CAPSULE ORAL DAILY
COMMUNITY
Start: 2020-03-02 | End: 2021-03-02

## 2020-07-29 RX ORDER — LIDOCAINE HYDROCHLORIDE 10 MG/ML
4 INJECTION, SOLUTION EPIDURAL; INFILTRATION; INTRACAUDAL; PERINEURAL
Status: COMPLETED | OUTPATIENT
Start: 2020-07-29 | End: 2020-07-29

## 2020-07-29 RX ADMIN — LIDOCAINE HYDROCHLORIDE 4 ML: 10 INJECTION, SOLUTION EPIDURAL; INFILTRATION; INTRACAUDAL; PERINEURAL at 08:34

## 2020-07-29 RX ADMIN — TRIAMCINOLONE ACETONIDE 40 MG: 40 INJECTION, SUSPENSION INTRA-ARTICULAR; INTRAMUSCULAR at 08:34

## 2020-11-18 ENCOUNTER — OFFICE VISIT (OUTPATIENT)
Dept: ORTHOPEDIC SURGERY | Facility: CLINIC | Age: 55
End: 2020-11-18

## 2020-11-18 VITALS — BODY MASS INDEX: 43.79 KG/M2 | WEIGHT: 238 LBS | HEIGHT: 62 IN

## 2020-11-18 DIAGNOSIS — R52 PAIN: ICD-10-CM

## 2020-11-18 DIAGNOSIS — E66.01 MORBID OBESITY WITH BMI OF 45.0-49.9, ADULT (HCC): ICD-10-CM

## 2020-11-18 DIAGNOSIS — M17.0 PRIMARY OSTEOARTHRITIS OF BOTH KNEES: Primary | ICD-10-CM

## 2020-11-18 PROCEDURE — 20610 DRAIN/INJ JOINT/BURSA W/O US: CPT | Performed by: ORTHOPAEDIC SURGERY

## 2020-11-18 RX ORDER — LIDOCAINE HYDROCHLORIDE 10 MG/ML
4 INJECTION, SOLUTION EPIDURAL; INFILTRATION; INTRACAUDAL; PERINEURAL
Status: COMPLETED | OUTPATIENT
Start: 2020-11-18 | End: 2020-11-18

## 2020-11-18 RX ORDER — ESCITALOPRAM OXALATE 20 MG/1
20 TABLET ORAL DAILY
COMMUNITY
Start: 2020-10-20

## 2020-11-18 RX ORDER — TRIAMCINOLONE ACETONIDE 40 MG/ML
40 INJECTION, SUSPENSION INTRA-ARTICULAR; INTRAMUSCULAR
Status: COMPLETED | OUTPATIENT
Start: 2020-11-18 | End: 2020-11-18

## 2020-11-18 RX ADMIN — LIDOCAINE HYDROCHLORIDE 4 ML: 10 INJECTION, SOLUTION EPIDURAL; INFILTRATION; INTRACAUDAL; PERINEURAL at 09:08

## 2020-11-18 RX ADMIN — TRIAMCINOLONE ACETONIDE 40 MG: 40 INJECTION, SUSPENSION INTRA-ARTICULAR; INTRAMUSCULAR at 09:08

## 2020-11-18 NOTE — PROGRESS NOTES
Subjective: Osteoarthritis both knees     Patient ID: Sharonda Preciado is a 55 y.o. female.    Chief Complaint:    History of Present Illness 55-year-old female returns once again with both knees symptomatic.  Last injection given in July helped for about 3 months resents today for possible repeat cortisone injection into both knees.  Cortisone works better than the gel injections in the past.       Social History     Occupational History   • Not on file   Tobacco Use   • Smoking status: Never Smoker   • Smokeless tobacco: Never Used   Substance and Sexual Activity   • Alcohol use: No   • Drug use: No   • Sexual activity: Never      Review of Systems   Constitutional: Negative for chills, diaphoresis, fever and unexpected weight change.   HENT: Negative for hearing loss, nosebleeds, sore throat and tinnitus.    Eyes: Negative for pain and visual disturbance.   Respiratory: Negative for cough, shortness of breath and wheezing.    Cardiovascular: Negative for chest pain and palpitations.   Gastrointestinal: Negative for abdominal pain, diarrhea, nausea and vomiting.   Endocrine: Negative for cold intolerance, heat intolerance and polydipsia.   Genitourinary: Negative for difficulty urinating, dysuria and hematuria.   Musculoskeletal: Positive for arthralgias. Negative for joint swelling and myalgias.   Skin: Negative for rash and wound.   Allergic/Immunologic: Negative for environmental allergies.   Neurological: Negative for dizziness, syncope and numbness.   Hematological: Does not bruise/bleed easily.   Psychiatric/Behavioral: Negative for dysphoric mood and sleep disturbance. The patient is not nervous/anxious.    All other systems reviewed and are negative.        No past medical history on file.  No past surgical history on file.  No family history on file.      Objective:  There were no vitals filed for this visit.      11/18/20  0858   Weight: 108 kg (238 lb)     Body mass index is 43.53 kg/m².        Ortho Exam    She is alert oriented x3.  Both knees were injected through the superolateral portal with 4 cc of lidocaine 1 cc Kenalog tolerated well.  Postinjection instructions given to the patient.    Assessment:        1. Primary osteoarthritis of both knees    2. Pain    3. Morbid obesity with BMI of 45.0-49.9, adult (CMS/[facility])           Plan: Return to see me as needed.      Large Joint Arthrocentesis  Date/Time: 11/18/2020 9:08 AM  Consent given by: patient  Site marked: site marked  Timeout: Immediately prior to procedure a time out was called to verify the correct patient, procedure, equipment, support staff and site/side marked as required   Supporting Documentation  Indications: pain   Procedure Details  Location: knee - Knee joint: BILATERAL.  Preparation: Patient was prepped and draped in the usual sterile fashion  Needle size: 22 G  Approach: superior  Medications administered: 4 mL lidocaine PF 1% 1 %; 40 mg triamcinolone acetonide 40 MG/ML  Patient tolerance: patient tolerated the procedure well with no immediate complications             Work Status:    SEBASTIAN query complete.    Orders:  Orders Placed This Encounter   Procedures   • Large Joint Arthrocentesis       Medications:  No orders of the defined types were placed in this encounter.      Followup:  Return if symptoms worsen or fail to improve.          Dictated utilizing Dragon dictation

## 2021-04-23 ENCOUNTER — OFFICE VISIT (OUTPATIENT)
Dept: ORTHOPEDIC SURGERY | Facility: CLINIC | Age: 56
End: 2021-04-23

## 2021-04-23 VITALS — WEIGHT: 255 LBS | BODY MASS INDEX: 46.93 KG/M2 | HEIGHT: 62 IN

## 2021-04-23 DIAGNOSIS — R52 PAIN: ICD-10-CM

## 2021-04-23 DIAGNOSIS — E66.01 MORBID OBESITY WITH BMI OF 45.0-49.9, ADULT (HCC): ICD-10-CM

## 2021-04-23 DIAGNOSIS — M17.0 PRIMARY OSTEOARTHRITIS OF BOTH KNEES: Primary | ICD-10-CM

## 2021-04-23 PROCEDURE — 20610 DRAIN/INJ JOINT/BURSA W/O US: CPT | Performed by: ORTHOPAEDIC SURGERY

## 2021-04-23 PROCEDURE — 73562 X-RAY EXAM OF KNEE 3: CPT | Performed by: ORTHOPAEDIC SURGERY

## 2021-04-23 RX ORDER — ALPRAZOLAM 0.25 MG/1
TABLET ORAL
COMMUNITY
Start: 2021-03-05 | End: 2023-02-28

## 2021-04-23 RX ORDER — LIDOCAINE HYDROCHLORIDE 10 MG/ML
4 INJECTION, SOLUTION EPIDURAL; INFILTRATION; INTRACAUDAL; PERINEURAL
Status: COMPLETED | OUTPATIENT
Start: 2021-04-23 | End: 2021-04-23

## 2021-04-23 RX ORDER — TRIAMTERENE AND HYDROCHLOROTHIAZIDE 37.5; 25 MG/1; MG/1
1 CAPSULE ORAL DAILY
COMMUNITY
Start: 2021-04-05

## 2021-04-23 RX ORDER — TRIAMCINOLONE ACETONIDE 40 MG/ML
40 INJECTION, SUSPENSION INTRA-ARTICULAR; INTRAMUSCULAR
Status: COMPLETED | OUTPATIENT
Start: 2021-04-23 | End: 2021-04-23

## 2021-04-23 RX ADMIN — TRIAMCINOLONE ACETONIDE 40 MG: 40 INJECTION, SUSPENSION INTRA-ARTICULAR; INTRAMUSCULAR at 08:44

## 2021-04-23 RX ADMIN — LIDOCAINE HYDROCHLORIDE 4 ML: 10 INJECTION, SOLUTION EPIDURAL; INFILTRATION; INTRACAUDAL; PERINEURAL at 08:44

## 2021-04-23 NOTE — PROGRESS NOTES
Subjective: Bilateral knee pain     Patient ID: Sharonda Preciado is a 55 y.o. female.    Chief Complaint:    History of Present Illness 55-year-old female with osteoarthritis of both knees presents today for possible cortisone injection into both knees.  Last injection given in November lasted for about 4 to 5 months returns for repeat injections today.     Social History     Occupational History   • Not on file   Tobacco Use   • Smoking status: Never Smoker   • Smokeless tobacco: Never Used   Vaping Use   • Vaping Use: Never used   Substance and Sexual Activity   • Alcohol use: No   • Drug use: No   • Sexual activity: Never      Review of Systems   Constitutional: Negative for chills, diaphoresis, fever and unexpected weight change.   HENT: Negative for hearing loss, nosebleeds, sore throat and tinnitus.    Eyes: Negative for pain and visual disturbance.   Respiratory: Negative for cough, shortness of breath and wheezing.    Cardiovascular: Negative for chest pain and palpitations.   Gastrointestinal: Negative for abdominal pain, diarrhea, nausea and vomiting.   Endocrine: Negative for cold intolerance, heat intolerance and polydipsia.   Genitourinary: Negative for difficulty urinating, dysuria and hematuria.   Musculoskeletal: Positive for arthralgias and myalgias. Negative for joint swelling.   Skin: Negative for rash and wound.   Allergic/Immunologic: Negative for environmental allergies.   Neurological: Negative for dizziness, syncope and numbness.   Hematological: Does not bruise/bleed easily.   Psychiatric/Behavioral: Negative for dysphoric mood and sleep disturbance. The patient is not nervous/anxious.          History reviewed. No pertinent past medical history.  History reviewed. No pertinent surgical history.  History reviewed. No pertinent family history.      Objective:  There were no vitals filed for this visit.      04/23/21  0830   Weight: 116 kg (255 lb)     Body mass index is 46.64 kg/m².        Ortho  Exam   AP lateral sunrise view both knees were done patient not had x-rays were 2 years does show some slight progression of the arthritis that she has bone to bone both medially and patellofemoral joint osteophytes in all 3 compartments.  Compared to previous x-rays there has been some slight progression medially.  There is no effusion in either knee no erythema no swelling but there is pain and crepitus with range of motion.  Both knees were injected 4 cc of Kenalog and 1 cc Celestone through the superolateral portal after sterile prep and tolerated it well.  Postinjection instructions given to the patient.  Continue the naproxen.    Assessment:        1. Primary osteoarthritis of both knees    2. Pain    3. Morbid obesity with BMI of 45.0-49.9, adult (CMS/[facility])           Plan: Return to see me as needed.  Answered all questions  Large Joint Arthrocentesis  Date/Time: 4/23/2021 8:44 AM  Consent given by: patient  Site marked: site marked  Timeout: Immediately prior to procedure a time out was called to verify the correct patient, procedure, equipment, support staff and site/side marked as required   Supporting Documentation  Indications: pain   Procedure Details  Location: knee - Knee joint: BILATEREAL KNEE.  Preparation: Patient was prepped and draped in the usual sterile fashion  Needle size: 22 G  Approach: superior (lateral)  Medications administered: 40 mg triamcinolone acetonide 40 MG/ML; 4 mL lidocaine PF 1% 1 %  Patient tolerance: patient tolerated the procedure well with no immediate complications                  Work Status:    SEBASTIAN query complete.    Orders:  Orders Placed This Encounter   Procedures   • Large Joint Arthrocentesis   • XR Knee 3 View Bilateral       Medications:  No orders of the defined types were placed in this encounter.      Followup:  Return if symptoms worsen or fail to improve.          Dictated utilizing Dragon dictation

## 2021-08-06 ENCOUNTER — OFFICE VISIT (OUTPATIENT)
Dept: ORTHOPEDIC SURGERY | Facility: CLINIC | Age: 56
End: 2021-08-06

## 2021-08-06 VITALS — HEIGHT: 62 IN | WEIGHT: 259 LBS | BODY MASS INDEX: 47.66 KG/M2

## 2021-08-06 DIAGNOSIS — M17.0 PRIMARY OSTEOARTHRITIS OF BOTH KNEES: Primary | ICD-10-CM

## 2021-08-06 DIAGNOSIS — E66.01 MORBID OBESITY WITH BMI OF 45.0-49.9, ADULT (HCC): ICD-10-CM

## 2021-08-06 DIAGNOSIS — R52 PAIN: ICD-10-CM

## 2021-08-06 PROCEDURE — 20610 DRAIN/INJ JOINT/BURSA W/O US: CPT | Performed by: ORTHOPAEDIC SURGERY

## 2021-08-06 PROCEDURE — 99213 OFFICE O/P EST LOW 20 MIN: CPT | Performed by: ORTHOPAEDIC SURGERY

## 2021-08-06 RX ORDER — BUPROPION HYDROCHLORIDE 150 MG/1
TABLET ORAL
COMMUNITY
Start: 2021-08-05 | End: 2023-02-28

## 2021-08-06 RX ORDER — TRIAMCINOLONE ACETONIDE 40 MG/ML
40 INJECTION, SUSPENSION INTRA-ARTICULAR; INTRAMUSCULAR
Status: COMPLETED | OUTPATIENT
Start: 2021-08-06 | End: 2021-08-06

## 2021-08-06 RX ORDER — LIDOCAINE HYDROCHLORIDE 10 MG/ML
4 INJECTION, SOLUTION EPIDURAL; INFILTRATION; INTRACAUDAL; PERINEURAL
Status: COMPLETED | OUTPATIENT
Start: 2021-08-06 | End: 2021-08-06

## 2021-08-06 RX ADMIN — LIDOCAINE HYDROCHLORIDE 4 ML: 10 INJECTION, SOLUTION EPIDURAL; INFILTRATION; INTRACAUDAL; PERINEURAL at 08:33

## 2021-08-06 RX ADMIN — TRIAMCINOLONE ACETONIDE 40 MG: 40 INJECTION, SUSPENSION INTRA-ARTICULAR; INTRAMUSCULAR at 08:33

## 2021-08-06 NOTE — PROGRESS NOTES
Subjective: Osteoarthritis both knees     Patient ID: Sharonda Preciado is a 55 y.o. female.    Chief Complaint:    History of Present Illness 55-year-old female returns once again with both knees symptomatic.  Cortisone injection given in April lasted till just recently.  Once again having pain with activities of daily living particularly navigating steps getting in and out of a chair and walking long distances.  Has been taking meloxicam and has been tolerating it but has not been given her significant relief.       Social History     Occupational History   • Not on file   Tobacco Use   • Smoking status: Never Smoker   • Smokeless tobacco: Never Used   Vaping Use   • Vaping Use: Never used   Substance and Sexual Activity   • Alcohol use: No   • Drug use: No   • Sexual activity: Never      Review of Systems   Constitutional: Negative for chills, diaphoresis, fever and unexpected weight change.   HENT: Negative for hearing loss, nosebleeds, sore throat and tinnitus.    Eyes: Negative for pain and visual disturbance.   Respiratory: Negative for cough, shortness of breath and wheezing.    Cardiovascular: Negative for chest pain and palpitations.   Gastrointestinal: Negative for abdominal pain, diarrhea, nausea and vomiting.   Endocrine: Negative for cold intolerance, heat intolerance and polydipsia.   Genitourinary: Negative for difficulty urinating, dysuria and hematuria.   Musculoskeletal: Positive for arthralgias and myalgias. Negative for joint swelling.   Skin: Negative for rash and wound.   Allergic/Immunologic: Negative for environmental allergies.   Neurological: Negative for dizziness, syncope and numbness.   Hematological: Does not bruise/bleed easily.   Psychiatric/Behavioral: Negative for dysphoric mood and sleep disturbance. The patient is not nervous/anxious.          History reviewed. No pertinent past medical history.  History reviewed. No pertinent surgical history.  History reviewed. No pertinent family  history.      Objective:  There were no vitals filed for this visit.      08/06/21  0819   Weight: 117 kg (259 lb)     Body mass index is 47.37 kg/m².        Ortho Exam   She is alert and oriented x3.  The knee shows only some induration erythema but there is pain and crepitus with range of motion but there is no instability.  Joint line tenderness and negative Darrell's.  Quad and hamstring function may 5/5.  The skin is cool to touch.  Calves are nontender.  Good distal pulses no motor or sensory deficit.  Good capillary refill.  Tolerating meloxicam.  She is not getting relief though and again the pain is beginning there.  Activity of daily living.    Assessment:        1. Primary osteoarthritis of both knees    2. Pain    3. Morbid obesity with BMI of 45.0-49.9, adult (CMS/[facility])           Plan: Patient wants to discuss the possibility of joint replacement although she does understand her BMI needs to be below 40 and recalculated today and it is 47.37.  She states she is talking in consultation with her primary care physician regarding weight reduction.  We did discuss possible referral to the [facility] surgeon but she states she does not want to this time consider that surgery.  Through after discussing treatment options for today for relief the only thing that has been effective in the past with her cortisone injections are going to reinject both knees today with 4 cc lidocaine 1 cc Kenalog was given through the superolateral portal tolerating well.  Postinjection instructions given to the patient.  She will return in 3 months at which time we will recalculate her BMI.  Answered all questions  Large Joint Arthrocentesis  Date/Time: 8/6/2021 8:33 AM  Consent given by: patient  Site marked: site marked  Timeout: Immediately prior to procedure a time out was called to verify the correct patient, procedure, equipment, support staff and site/side marked as required   Supporting Documentation  Indications: pain    Procedure Details  Location: knee - Knee joint: bilateral.  Preparation: Patient was prepped and draped in the usual sterile fashion  Needle size: 22 G  Medications administered: 4 mL lidocaine PF 1% 1 %; 40 mg triamcinolone acetonide 40 MG/ML  Patient tolerance: patient tolerated the procedure well with no immediate complications                  Work Status:    SEBASTIAN query complete.    Orders:  Orders Placed This Encounter   Procedures   • Large Joint Arthrocentesis       Medications:  No orders of the defined types were placed in this encounter.      Followup:  Return if symptoms worsen or fail to improve.          Dictated utilizing Dragon dictation

## 2021-10-02 NOTE — PROGRESS NOTES
Subjective: Osteoarthritis both knees     Patient ID: Sharonda Preciado is a 54 y.o. female.    Chief Complaint:    History of Present Illness 54-year-old female with osteoarthritis of both knees presents today for consideration of repeat cortisone injection.  In the past the patient had wanted to consider surgery but she still does not want proceed at this time plus her BMI today is reported to be 43.7 she does understand mostly below 40 before surgery can be considered.  She is taken naproxen on a daily basis       Social History     Occupational History   • Not on file   Tobacco Use   • Smoking status: Never Smoker   • Smokeless tobacco: Never Used   Substance and Sexual Activity   • Alcohol use: No   • Drug use: No   • Sexual activity: Never      Review of Systems   Constitutional: Negative for chills, diaphoresis, fever and unexpected weight change.   HENT: Negative for hearing loss, nosebleeds, sore throat and tinnitus.    Eyes: Negative for pain and visual disturbance.   Respiratory: Negative for cough, shortness of breath and wheezing.    Cardiovascular: Negative for chest pain and palpitations.   Gastrointestinal: Negative for abdominal pain, diarrhea, nausea and vomiting.   Endocrine: Negative for cold intolerance, heat intolerance and polydipsia.   Genitourinary: Negative for difficulty urinating, dysuria and hematuria.   Musculoskeletal: Positive for arthralgias and myalgias. Negative for joint swelling.   Skin: Negative for rash and wound.   Allergic/Immunologic: Negative for environmental allergies.   Neurological: Negative for dizziness, syncope and numbness.   Hematological: Does not bruise/bleed easily.   Psychiatric/Behavioral: Negative for dysphoric mood and sleep disturbance. The patient is not nervous/anxious.          History reviewed. No pertinent past medical history.  History reviewed. No pertinent surgical history.  History reviewed. No pertinent family history.      Objective:  There were no  vitals filed for this visit.      07/29/20  0827   Weight: 108 kg (239 lb)     Body mass index is 43.71 kg/m².        Ortho Exam   She is alert and oriented x3.  No effusion either knee.  Both knees therefore injected 4 cc lidocaine 1 cc Kenalog through the superolateral portal tolerated well.  Postinjection instructions given to the patient.  Return to see me as needed.    Assessment:        1. Pain    2. Primary osteoarthritis of both knees    3. Morbid obesity with BMI of 45.0-49.9, adult (CMS/[facility])           Plan: Return to see me as needed.  Answered all questions  Large Joint Arthrocentesis  Date/Time: 7/29/2020 8:34 AM  Consent given by: patient  Site marked: site marked  Timeout: Immediately prior to procedure a time out was called to verify the correct patient, procedure, equipment, support staff and site/side marked as required   Supporting Documentation  Indications: pain   Procedure Details  Location: knee - Knee joint: BILATERAL KNEE.  Preparation: Patient was prepped and draped in the usual sterile fashion  Needle size: 22 G  Approach: superior (LATERAL)  Medications administered: 4 mL lidocaine PF 1% 1 %; 40 mg triamcinolone acetonide 40 MG/ML  Patient tolerance: patient tolerated the procedure well with no immediate complications                  Work Status:    SEBASTIAN query complete.    Orders:  Orders Placed This Encounter   Procedures   • Large Joint Arthrocentesis       Medications:  No orders of the defined types were placed in this encounter.      Followup:  Return if symptoms worsen or fail to improve.          Dictated utilizing Dragon dictation    ACP

## 2021-11-09 ENCOUNTER — OFFICE VISIT (OUTPATIENT)
Dept: ORTHOPEDIC SURGERY | Facility: CLINIC | Age: 56
End: 2021-11-09

## 2021-11-09 VITALS — HEIGHT: 62 IN | WEIGHT: 248.4 LBS | BODY MASS INDEX: 45.71 KG/M2

## 2021-11-09 DIAGNOSIS — E66.01 MORBID OBESITY WITH BMI OF 45.0-49.9, ADULT (HCC): ICD-10-CM

## 2021-11-09 DIAGNOSIS — M17.0 PRIMARY OSTEOARTHRITIS OF BOTH KNEES: Primary | ICD-10-CM

## 2021-11-09 DIAGNOSIS — R52 PAIN: ICD-10-CM

## 2021-11-09 PROCEDURE — 20610 DRAIN/INJ JOINT/BURSA W/O US: CPT | Performed by: ORTHOPAEDIC SURGERY

## 2021-11-09 RX ORDER — LIDOCAINE HYDROCHLORIDE 10 MG/ML
4 INJECTION, SOLUTION EPIDURAL; INFILTRATION; INTRACAUDAL; PERINEURAL
Status: COMPLETED | OUTPATIENT
Start: 2021-11-09 | End: 2021-11-09

## 2021-11-09 RX ORDER — TRIAMCINOLONE ACETONIDE 40 MG/ML
40 INJECTION, SUSPENSION INTRA-ARTICULAR; INTRAMUSCULAR
Status: COMPLETED | OUTPATIENT
Start: 2021-11-09 | End: 2021-11-09

## 2021-11-09 RX ADMIN — TRIAMCINOLONE ACETONIDE 40 MG: 40 INJECTION, SUSPENSION INTRA-ARTICULAR; INTRAMUSCULAR at 13:45

## 2021-11-09 RX ADMIN — LIDOCAINE HYDROCHLORIDE 4 ML: 10 INJECTION, SOLUTION EPIDURAL; INFILTRATION; INTRACAUDAL; PERINEURAL at 13:45

## 2021-11-09 NOTE — PROGRESS NOTES
Subjective: Osteoarthritis both knees     Patient ID: Sharonda Preciado is a 56 y.o. female.    Chief Complaint:    History of Present Illness 56-year-old female returns undergo a cortisone injection into both knees.  Is been 3 months since her last injection.  Since last seen also BMI is now down to 45.43.  On her last visit in August it was 47.37 so she has lost weight.  Again still not a candidate for total joint replacement but wishes to proceed with repeat cortisone injection.       Social History     Occupational History   • Not on file   Tobacco Use   • Smoking status: Never Smoker   • Smokeless tobacco: Never Used   Vaping Use   • Vaping Use: Never used   Substance and Sexual Activity   • Alcohol use: No   • Drug use: No   • Sexual activity: Never      Review of Systems   Constitutional: Negative for chills, diaphoresis, fever and unexpected weight change.   HENT: Negative for hearing loss, nosebleeds, sore throat and tinnitus.    Eyes: Negative for pain and visual disturbance.   Respiratory: Negative for cough, shortness of breath and wheezing.    Cardiovascular: Negative for chest pain and palpitations.   Gastrointestinal: Negative for abdominal pain, diarrhea, nausea and vomiting.   Endocrine: Negative for cold intolerance, heat intolerance and polydipsia.   Genitourinary: Negative for difficulty urinating, dysuria and hematuria.   Musculoskeletal: Positive for arthralgias and myalgias. Negative for joint swelling.   Skin: Negative for rash and wound.   Allergic/Immunologic: Negative for environmental allergies.   Neurological: Negative for dizziness, syncope and numbness.   Hematological: Does not bruise/bleed easily.   Psychiatric/Behavioral: Negative for dysphoric mood and sleep disturbance. The patient is not nervous/anxious.          History reviewed. No pertinent past medical history.  History reviewed. No pertinent surgical history.  History reviewed. No pertinent family history.      Objective:  There  were no vitals filed for this visit.      11/09/21  1334   Weight: 113 kg (248 lb 6.4 oz)     Body mass index is 45.43 kg/m².        Ortho Exam   She is alert and oriented x3.  Neither knee shows any swelling effusion erythema the skin is cool to touch.  There is pain with range of motion but there is no instability.  No motor or sensory deficit the calves are nontender.  Both knees it were therefore injected 4 cc of lidocaine 1 cc Kenalog through the superolateral portal tolerating well.  Postinjection instructions given to the patient.    Assessment:        1. Primary osteoarthritis of both knees    2. Pain    3. Morbid obesity with BMI of 45.0-49.9, adult (HCC)           Plan: Return in 3 months for possible repeat injections.  Answered all questions  Large Joint Arthrocentesis  Date/Time: 11/9/2021 1:45 PM  Consent given by: patient  Site marked: site marked  Timeout: Immediately prior to procedure a time out was called to verify the correct patient, procedure, equipment, support staff and site/side marked as required   Supporting Documentation  Indications: pain   Procedure Details  Location: knee (bilateral) -   Preparation: Patient was prepped and draped in the usual sterile fashion  Needle size: 22 G  Approach: superior  Medications administered: 4 mL lidocaine PF 1% 1 %; 40 mg triamcinolone acetonide 40 MG/ML  Patient tolerance: patient tolerated the procedure well with no immediate complications                  Work Status:    SEBASTIAN query complete.    Orders:  Orders Placed This Encounter   Procedures   • Large Joint Arthrocentesis       Medications:  No orders of the defined types were placed in this encounter.      Followup:  Return if symptoms worsen or fail to improve.          Dictated utilizing Dragon dictation

## 2022-02-10 ENCOUNTER — OFFICE VISIT (OUTPATIENT)
Dept: ORTHOPEDIC SURGERY | Facility: CLINIC | Age: 57
End: 2022-02-10

## 2022-02-10 VITALS — HEIGHT: 62 IN | WEIGHT: 248 LBS | BODY MASS INDEX: 45.64 KG/M2

## 2022-02-10 DIAGNOSIS — M17.0 PRIMARY OSTEOARTHRITIS OF BOTH KNEES: Primary | ICD-10-CM

## 2022-02-10 DIAGNOSIS — R52 PAIN: ICD-10-CM

## 2022-02-10 PROCEDURE — 20610 DRAIN/INJ JOINT/BURSA W/O US: CPT | Performed by: ORTHOPAEDIC SURGERY

## 2022-02-10 RX ORDER — TRIAMCINOLONE ACETONIDE 40 MG/ML
40 INJECTION, SUSPENSION INTRA-ARTICULAR; INTRAMUSCULAR
Status: COMPLETED | OUTPATIENT
Start: 2022-02-10 | End: 2022-02-10

## 2022-02-10 RX ORDER — SEMAGLUTIDE 0.25 MG/.5ML
1 INJECTION, SOLUTION SUBCUTANEOUS WEEKLY
COMMUNITY
Start: 2022-01-31

## 2022-02-10 RX ORDER — LIDOCAINE HYDROCHLORIDE 10 MG/ML
4 INJECTION, SOLUTION INFILTRATION; PERINEURAL
Status: COMPLETED | OUTPATIENT
Start: 2022-02-10 | End: 2022-02-10

## 2022-02-10 RX ADMIN — LIDOCAINE HYDROCHLORIDE 4 ML: 10 INJECTION, SOLUTION INFILTRATION; PERINEURAL at 08:18

## 2022-02-10 RX ADMIN — TRIAMCINOLONE ACETONIDE 40 MG: 40 INJECTION, SUSPENSION INTRA-ARTICULAR; INTRAMUSCULAR at 08:18

## 2022-02-10 NOTE — PROGRESS NOTES
Subjective: Osteoarthritis both knees     Patient ID: Sharonda Preciado is a 56 y.o. female.    Chief Complaint:    History of Present Illness 56-year-old female returns today for repeat cortisone injection of both knees.  Last 1 given in November lasted until about 3 weeks ago.  She is requesting repeat injections.  Did not weigh her today but she has according to her epic notes from Dr. Vela has lost weight is a BMI is now between 40 and 45.       Social History     Occupational History   • Not on file   Tobacco Use   • Smoking status: Never Smoker   • Smokeless tobacco: Never Used   Vaping Use   • Vaping Use: Never used   Substance and Sexual Activity   • Alcohol use: No   • Drug use: No   • Sexual activity: Never      Review of Systems   Constitutional: Negative for chills, diaphoresis, fever and unexpected weight change.   HENT: Negative for hearing loss, nosebleeds, sore throat and tinnitus.    Eyes: Negative for pain and visual disturbance.   Respiratory: Negative for cough, shortness of breath and wheezing.    Cardiovascular: Negative for chest pain and palpitations.   Gastrointestinal: Negative for abdominal pain, diarrhea, nausea and vomiting.   Endocrine: Negative for cold intolerance, heat intolerance and polydipsia.   Genitourinary: Negative for difficulty urinating, dysuria and hematuria.   Musculoskeletal: Positive for arthralgias and myalgias. Negative for joint swelling.   Skin: Negative for rash and wound.   Allergic/Immunologic: Negative for environmental allergies.   Neurological: Negative for dizziness, syncope and numbness.   Hematological: Does not bruise/bleed easily.   Psychiatric/Behavioral: Negative for dysphoric mood and sleep disturbance. The patient is not nervous/anxious.          History reviewed. No pertinent past medical history.  History reviewed. No pertinent surgical history.  History reviewed. No pertinent family history.      Objective:  There were no vitals filed for this  visit.      02/10/22  0813   Weight: 112 kg (248 lb)     Body mass index is 45.36 kg/m².        Ortho Exam  She is alert and oriented x3.  Both knees were injected after sterile prep and 4 cc lidocaine 1 cc Kenalog.  Postinjection instructions given to the patient.    Assessment:        1. Primary osteoarthritis of both knees    2. Pain           Plan: Return as needed.  As discussed previously these injections can be repeated every 3 months.  Answered all questions      Large Joint Arthrocentesis  Date/Time: 2/10/2022 8:18 AM  Consent given by: patient  Site marked: site marked  Timeout: Immediately prior to procedure a time out was called to verify the correct patient, procedure, equipment, support staff and site/side marked as required   Supporting Documentation  Indications: pain   Procedure Details  Location: knee - Knee joint: bilateral.  Preparation: Patient was prepped and draped in the usual sterile fashion  Needle size: 22 G  Approach: superior (lateral)  Medications administered: 40 mg triamcinolone acetonide 40 MG/ML; 4 mL lidocaine 1 %  Patient tolerance: patient tolerated the procedure well with no immediate complications            Work Status:    SEBASTIAN query complete.    Orders:  Orders Placed This Encounter   Procedures   • Large Joint Arthrocentesis       Medications:  No orders of the defined types were placed in this encounter.      Followup:  Return if symptoms worsen or fail to improve.          Dictated utilizing Dragon dictation

## 2022-07-25 ENCOUNTER — TELEPHONE (OUTPATIENT)
Dept: ORTHOPEDIC SURGERY | Facility: CLINIC | Age: 57
End: 2022-07-25

## 2022-07-25 NOTE — TELEPHONE ENCOUNTER
Appointment Request From: Sharonda Preciado     With Provider: Richar Gerber MD [[facility]]     Preferred Date Range: 8/8/2022 - 8/12/2022     Preferred Times: Any Time     Reason for visit: Follow-up     Comments:  Time for bilateral knee steroid injection

## 2022-08-02 ENCOUNTER — CLINICAL SUPPORT (OUTPATIENT)
Dept: ORTHOPEDIC SURGERY | Facility: CLINIC | Age: 57
End: 2022-08-02

## 2022-08-02 VITALS — BODY MASS INDEX: 42.33 KG/M2 | WEIGHT: 230 LBS | HEIGHT: 62 IN

## 2022-08-02 DIAGNOSIS — M17.0 PRIMARY OSTEOARTHRITIS OF BOTH KNEES: Primary | ICD-10-CM

## 2022-08-02 PROCEDURE — 20610 DRAIN/INJ JOINT/BURSA W/O US: CPT | Performed by: ORTHOPAEDIC SURGERY

## 2022-08-02 RX ORDER — LIDOCAINE HYDROCHLORIDE 10 MG/ML
4 INJECTION, SOLUTION EPIDURAL; INFILTRATION; INTRACAUDAL; PERINEURAL
Status: COMPLETED | OUTPATIENT
Start: 2022-08-02 | End: 2022-08-02

## 2022-08-02 RX ORDER — TRIAMCINOLONE ACETONIDE 40 MG/ML
40 INJECTION, SUSPENSION INTRA-ARTICULAR; INTRAMUSCULAR
Status: COMPLETED | OUTPATIENT
Start: 2022-08-02 | End: 2022-08-02

## 2022-08-02 RX ADMIN — LIDOCAINE HYDROCHLORIDE 4 ML: 10 INJECTION, SOLUTION EPIDURAL; INFILTRATION; INTRACAUDAL; PERINEURAL at 15:22

## 2022-08-02 RX ADMIN — TRIAMCINOLONE ACETONIDE 40 MG: 40 INJECTION, SUSPENSION INTRA-ARTICULAR; INTRAMUSCULAR at 15:22

## 2022-08-02 NOTE — PROGRESS NOTES
Subjective: Osteoarthritis both knees     Patient ID: Sharonda Preciado is a 56 y.o. female.    Chief Complaint:    History of Present Illness 56-year-old female returns with both knees symptomatic.  Cortisone injection in February lasted until just recently she is requesting repeat injections before she goes on a vacation out of the country in a couple of weeks.       Social History     Occupational History   • Not on file   Tobacco Use   • Smoking status: Never Smoker   • Smokeless tobacco: Never Used   Vaping Use   • Vaping Use: Never used   Substance and Sexual Activity   • Alcohol use: No   • Drug use: No   • Sexual activity: Never      Review of Systems   Constitutional: Negative for chills, diaphoresis, fever and unexpected weight change.   HENT: Negative for hearing loss, nosebleeds, sore throat and tinnitus.    Eyes: Negative for pain and visual disturbance.   Respiratory: Negative for cough, shortness of breath and wheezing.    Cardiovascular: Negative for chest pain and palpitations.   Gastrointestinal: Negative for abdominal pain, diarrhea, nausea and vomiting.   Endocrine: Negative for cold intolerance, heat intolerance and polydipsia.   Genitourinary: Negative for difficulty urinating, dysuria and hematuria.   Musculoskeletal: Positive for arthralgias, joint swelling and myalgias.   Skin: Negative for rash and wound.   Allergic/Immunologic: Negative for environmental allergies.   Neurological: Negative for dizziness, syncope and numbness.   Hematological: Does not bruise/bleed easily.   Psychiatric/Behavioral: Negative for dysphoric mood and sleep disturbance. The patient is not nervous/anxious.          History reviewed. No pertinent past medical history.  History reviewed. No pertinent surgical history.  History reviewed. No pertinent family history.      Objective:  There were no vitals filed for this visit.      08/02/22  1520   Weight: 104 kg (230 lb)     Body mass index is 42.07 kg/m².        Ortho  Exam   She is alert and oriented x3.  Neither knee shows any swelling effusion erythema the skin is cool to touch.  Both knees were injected through the superolateral portal of 4 cc lidocaine 1 cc Kenalog after sterile prepping.  Postinjection instructions given to the patient.    Assessment:        1. Primary osteoarthritis of both knees           Plan: Return to see me as needed.  I did discuss that he is having pain prior to travel she can call for steroid pack otherwise return as        Large Joint Arthrocentesis  Date/Time: 8/2/2022 3:22 PM  Consent given by: patient  Site marked: site marked  Timeout: Immediately prior to procedure a time out was called to verify the correct patient, procedure, equipment, support staff and site/side marked as required   Supporting Documentation  Indications: pain   Procedure Details  Location: knee - Knee joint: BILATERAL KNEE.  Preparation: Patient was prepped and draped in the usual sterile fashion  Needle size: 22 G  Approach: anterolateral  Medications administered: 40 mg triamcinolone acetonide 40 MG/ML; 4 mL lidocaine PF 1% 1 %  Patient tolerance: patient tolerated the procedure well with no immediate complications            Work Status:    SEBASTIAN query complete.    Orders:  Orders Placed This Encounter   Procedures   • Large Joint Arthrocentesis       Medications:  No orders of the defined types were placed in this encounter.      Followup:  Return if symptoms worsen or fail to improve.          Dictated utilizing Dragon dictation

## 2022-08-12 RX ORDER — METHYLPREDNISOLONE 4 MG/1
TABLET ORAL
Qty: 21 TABLET | Refills: 0 | Status: SHIPPED | OUTPATIENT
Start: 2022-08-12 | End: 2023-02-28

## 2023-02-28 ENCOUNTER — OFFICE VISIT (OUTPATIENT)
Dept: ORTHOPEDIC SURGERY | Facility: CLINIC | Age: 58
End: 2023-02-28
Payer: COMMERCIAL

## 2023-02-28 VITALS — HEIGHT: 62 IN | BODY MASS INDEX: 37.54 KG/M2 | WEIGHT: 204 LBS

## 2023-02-28 DIAGNOSIS — M17.0 PRIMARY OSTEOARTHRITIS OF BOTH KNEES: Primary | ICD-10-CM

## 2023-02-28 DIAGNOSIS — R52 PAIN: ICD-10-CM

## 2023-02-28 PROCEDURE — 99213 OFFICE O/P EST LOW 20 MIN: CPT | Performed by: ORTHOPAEDIC SURGERY

## 2023-02-28 PROCEDURE — 73562 X-RAY EXAM OF KNEE 3: CPT | Performed by: ORTHOPAEDIC SURGERY

## 2023-02-28 RX ORDER — LEVOTHYROXINE SODIUM 0.2 MG/1
200 TABLET ORAL DAILY
COMMUNITY
Start: 2023-02-04

## 2023-02-28 RX ORDER — LORATADINE 10 MG/1
10 TABLET ORAL DAILY
COMMUNITY
End: 2023-03-09

## 2023-02-28 RX ORDER — BUPROPION HYDROCHLORIDE 150 MG/1
1 TABLET ORAL DAILY
COMMUNITY
Start: 2022-11-09

## 2023-02-28 RX ORDER — DIPHENOXYLATE HYDROCHLORIDE AND ATROPINE SULFATE 2.5; .025 MG/1; MG/1
1 TABLET ORAL DAILY
COMMUNITY
End: 2023-03-09 | Stop reason: SDUPTHER

## 2023-02-28 RX ORDER — DEXTROAMPHETAMINE SACCHARATE, AMPHETAMINE ASPARTATE, DEXTROAMPHETAMINE SULFATE AND AMPHETAMINE SULFATE 5; 5; 5; 5 MG/1; MG/1; MG/1; MG/1
20 TABLET ORAL DAILY
COMMUNITY
Start: 2022-12-30

## 2023-02-28 NOTE — PROGRESS NOTES
Subjective: Osteoarthritis both knees     Patient ID: Sharonda Preciado is a 57 y.o. female.    Chief Complaint:    History of Present Illness 57-year-old female returns today wanting to consider total knee arthroplasty.  Since last seen the patient has lost significant weight her BMI now is down to 37.31 it was over 46 on her last visit.  Still having significant pain with all actives of daily living.       Social History     Occupational History   • Not on file   Tobacco Use   • Smoking status: Never   • Smokeless tobacco: Never   Vaping Use   • Vaping Use: Never used   Substance and Sexual Activity   • Alcohol use: No   • Drug use: Never   • Sexual activity: Not Currently     Partners: Male     Birth control/protection: None      Review of Systems      Past Medical History:   Diagnosis Date   • Knee swelling 2000     No past surgical history on file.  Family History   Problem Relation Age of Onset   • Diabetes Mother    • Rheumatologic disease Maternal Grandmother         RA   • Rheumatologic disease Maternal Uncle         Sjorgrens         Objective:  There were no vitals filed for this visit.      02/28/23  0848   Weight: 92.5 kg (204 lb)     Body mass index is 37.31 kg/m².        Ortho Exam   AP lateral sunrise view does show end-stage degenerative arthritis with bone-on-bone in the medial compartment with significant osteophytes in the patellofemoral compartment.  There has been some progression since compared to previous x-rays of several years ago.  She is alert and oriented x3.  Neither knee shows any swelling effusion erythema but there is marked pain and crepitus with range of motion to both knees.  Quad hamstring function 5/5.  No instability in flexion extension nor any varus or valgus instability at 10-30 degrees.  Medial joint line tenderness with a negative Darrell's.  The calf is nontender.  Good distal pulses no motor or sensory deficit.    Assessment:        1. Primary osteoarthritis of both knees     2. Pain           Plan: Again the patient is having significant pain discomfort.  Reviewed the x-rays with the patient on today's visit.  Again she failed gel injections cortisone gave her some relief but is not long-lasting.  I will refer her to Dr. Meneses for total knee arthroplasty.  I will let him discuss with her length of rehab recovery and risk and benefits.  Patient was in agreement.  Appointment made to be seen as soon as possible.  Answered all questions            Work Status:    SEBASTIAN query complete.    Orders:  Orders Placed This Encounter   Procedures   • XR Knee 3 View Bilateral       Medications:  No orders of the defined types were placed in this encounter.      Followup:  Return in about 1 week (around 3/7/2023).          Dictated utilizing Dragon dictation

## 2023-03-02 ENCOUNTER — OFFICE VISIT (OUTPATIENT)
Dept: ORTHOPEDIC SURGERY | Facility: CLINIC | Age: 58
End: 2023-03-02
Payer: COMMERCIAL

## 2023-03-02 VITALS
HEART RATE: 97 BPM | BODY MASS INDEX: 36.99 KG/M2 | DIASTOLIC BLOOD PRESSURE: 98 MMHG | HEIGHT: 62 IN | SYSTOLIC BLOOD PRESSURE: 160 MMHG | WEIGHT: 201 LBS

## 2023-03-02 DIAGNOSIS — M17.12 PRIMARY OSTEOARTHRITIS OF LEFT KNEE: ICD-10-CM

## 2023-03-02 DIAGNOSIS — M17.11 PRIMARY OSTEOARTHRITIS OF RIGHT KNEE: Primary | ICD-10-CM

## 2023-03-02 PROCEDURE — 99214 OFFICE O/P EST MOD 30 MIN: CPT | Performed by: INTERNAL MEDICINE

## 2023-03-02 RX ORDER — MELOXICAM 7.5 MG/1
15 TABLET ORAL ONCE
Status: CANCELLED | OUTPATIENT
Start: 2023-03-02 | End: 2023-03-02

## 2023-03-02 RX ORDER — PREGABALIN 150 MG/1
150 CAPSULE ORAL ONCE
Status: CANCELLED | OUTPATIENT
Start: 2023-03-02 | End: 2023-03-02

## 2023-03-02 RX ORDER — CHLORHEXIDINE GLUCONATE 500 MG/1
CLOTH TOPICAL ONCE
Status: CANCELLED | OUTPATIENT
Start: 2023-03-02

## 2023-03-02 NOTE — PROGRESS NOTES
"Subjective:     Patient ID: Sharonda Preciado is a 57 y.o. female.    Chief Complaint:    History of Present Illness  Sharonda Preciado presents to clinic today for evaluation of right knee pain.  The patient states she has had bilateral knee arthritis for years and has been seen by Dr. Gerber.  She has had countless corticosteroid injections and a gel injection.  She states she had better luck with the corticosteroid injections but the gel injections did not help at all.  She is also recently lost 40 pounds with calorie counting intermittent fasting and diet supplements.  She states that she is feeling better but she is to the point where her knee pain is significantly affecting her quality of life and activities of daily living.  She is here today for right total knee arthroplasty evaluation.     Social History     Occupational History   • Not on file   Tobacco Use   • Smoking status: Never   • Smokeless tobacco: Never   Vaping Use   • Vaping Use: Never used   Substance and Sexual Activity   • Alcohol use: No   • Drug use: Never   • Sexual activity: Not Currently     Partners: Male     Birth control/protection: None      Past Medical History:   Diagnosis Date   • Knee swelling 2000     No past surgical history on file.    Family History   Problem Relation Age of Onset   • Diabetes Mother    • Rheumatologic disease Maternal Grandmother         RA   • Rheumatologic disease Maternal Uncle         Sjorgrens                 Objective:  Vitals:    03/02/23 0845   BP: 160/98   Pulse: 97   Weight: 91.2 kg (201 lb)   Height: 157.5 cm (62\")         03/02/23  0845   Weight: 91.2 kg (201 lb)     Body mass index is 36.76 kg/m².        Right Knee Exam     Tenderness   The patient is experiencing tenderness in the medial joint line, medial retinaculum and patella.    Range of Motion   Extension: 0   Flexion: 120     Tests   Darrell:  Medial - negative Lateral - negative  Varus: negative Valgus: negative  Drawer:  Anterior - negative    " Posterior - negative    Other   Erythema: absent  Scars: absent  Sensation: normal  Pulse: present  Swelling: none  Effusion: no effusion present    Comments:  Varus alignment rigid      Left Knee Exam     Tenderness   The patient is experiencing tenderness in the medial retinaculum, medial joint line and patella.    Range of Motion   Extension: 0   Flexion: 120     Tests   Darrell:  Medial - negative Lateral - negative  Varus: negative Valgus: negative  Drawer:  Anterior - negative     Posterior - negative    Other   Erythema: absent  Scars: absent  Sensation: normal  Pulse: present  Effusion: no effusion present    Comments:  Rigid varus alignment               Imaging: 3 standing views of bilateral knees ordered by Dr. Gerber on 2/20/2023 reviewed by myself in the office today  Indication: Right greater than left knee pain  Findings: X-rays demonstrate bilateral severe medial and patellofemoral joint osteoarthritis with complete loss of joint space medially, large medial and posterior femoral condyle osteophytes.  Patellofemoral joint shows complete loss of joint space.  Lateral joint space is lesser involved.  Comparative studies: None    Assessment:        1. Primary osteoarthritis of right knee    2. Primary osteoarthritis of both knees           Plan:        She has failed conservative management and is interested in proceeding with knee replacement surgery.  She is hopeful to have this right knee replaced as soon as possible and her left knee replaced before June 30, 2023 when her insurance deductible resets.    The goals, indications, risks, and complications of the procedure were discussed with the patient. Specifically, I discussed the expectations for lateral knee numbness or parasthesias, difficulty with kneeling, noise generation by the knee replacement, and occasional pain. I also explained that in some series of patients in the research literature, up to 20% of patients are dissatisfied with their  total knee arthroplasty. I also discussed the risks of the procedure, including stiffness, fracture, implant loosening, implant failure, venous thromboembolism, infection, nerve palsy, vascular injury, need for more procedures and the risks of anesthesia. I also explained that she would meet with Anesthesiology preoperatively to discuss anesthetic risk.  All questions were answered.     Plan for right total knee arthroplasty.    Implants: [employer] cemented Legion TKS with [employer] (NO PRESS FIT Ni ALLERGY)  Anticoagulation: Eliquis anticardiolipin antibody  Antibiotics: Cefazolin and Vanc  Admission Type: Same Day  TXA: Jinny Preciado was in agreement with plan and had all questions answered.     Medications:  No orders of the defined types were placed in this encounter.      Followup:  No follow-ups on file.    Diagnoses and all orders for this visit:    1. Primary osteoarthritis of right knee (Primary)  -     Case Request; Standing  -     CBC and Differential; Future  -     Basic metabolic panel; Future  -     MRSA Screen Culture (Outpatient) - Swab, Nares; Future  -     Hemoglobin A1c; Future  -     Type and screen; Future  -     Urinalysis With Culture If Indicated -; Future  -     Chlorhexidine Gluconate Cloth 2 % pads  -     Tranexamic Acid 1,000 mg in sodium chloride 0.9 % 100 mL  -     Tranexamic Acid 1,000 mg in sodium chloride 0.9 % 100 mL  -     ethyl alcohol 62 % 2 each  -     lactated ringers bolus 500 mL  -     ceFAZolin (ANCEF) 2 g in sodium chloride 0.9 % 100 mL IVPB  -     meloxicam (MOBIC) tablet 15 mg  -     pregabalin (LYRICA) capsule 150 mg  -     vancomycin (VANCOCIN) 1,250 mg in sodium chloride 0.9 % 250 mL IVPB  -     Case Request    2. Primary osteoarthritis of both knees    Other orders  -     Follow Anesthesia Guidelines / Protocol; Future  -     Provide instructions to patient regarding NPO status  -     Chlorhexidine Skin Prep - Educate and Review With Patient;  Future  -     Care Order / Instruction for all Female Patients: Clean Catch Urinalysis with culture if indicated if patient symptomatic: dysuria, flank or lower abdominal pain, burning, urgency or frequency  -     Follow Anesthesia Guidelines / Protocol; Standing  -     Verify NPO Status; Standing  -     SCD (sequential compression device)- to be placed on patient in Pre-op; Standing  -     Clip operative site; Standing  -     Obtain informed consent (if not collected inpatient or PAT); Standing  -     Nerve Block; Standing          Dictated utilizing Dragon dictation

## 2023-03-09 ENCOUNTER — PRE-ADMISSION TESTING (OUTPATIENT)
Dept: PREADMISSION TESTING | Facility: HOSPITAL | Age: 58
End: 2023-03-09
Payer: COMMERCIAL

## 2023-03-09 ENCOUNTER — ANESTHESIA EVENT (OUTPATIENT)
Dept: PERIOP | Facility: HOSPITAL | Age: 58
End: 2023-03-09
Payer: COMMERCIAL

## 2023-03-09 VITALS
RESPIRATION RATE: 16 BRPM | SYSTOLIC BLOOD PRESSURE: 166 MMHG | DIASTOLIC BLOOD PRESSURE: 104 MMHG | BODY MASS INDEX: 37.14 KG/M2 | HEIGHT: 62 IN | OXYGEN SATURATION: 97 % | WEIGHT: 201.83 LBS | HEART RATE: 87 BPM

## 2023-03-09 DIAGNOSIS — M17.11 PRIMARY OSTEOARTHRITIS OF RIGHT KNEE: ICD-10-CM

## 2023-03-09 DIAGNOSIS — M17.11 PRIMARY OSTEOARTHRITIS OF RIGHT KNEE: Primary | ICD-10-CM

## 2023-03-09 LAB
ABO GROUP BLD: NORMAL
ABO GROUP BLD: NORMAL
BACTERIA UR QL AUTO: ABNORMAL /HPF
BILIRUB UR QL STRIP: NEGATIVE
BLD GP AB SCN SERPL QL: NEGATIVE
CLARITY UR: ABNORMAL
COLOR UR: YELLOW
GLUCOSE UR STRIP-MCNC: NEGATIVE MG/DL
HBA1C MFR BLD: 5.1 % (ref 4.8–5.6)
HGB UR QL STRIP.AUTO: NEGATIVE
HYALINE CASTS UR QL AUTO: ABNORMAL /LPF
KETONES UR QL STRIP: NEGATIVE
LEUKOCYTE ESTERASE UR QL STRIP.AUTO: ABNORMAL
NITRITE UR QL STRIP: NEGATIVE
PH UR STRIP.AUTO: 7 [PH] (ref 4.5–8)
PROT UR QL STRIP: ABNORMAL
QT INTERVAL: 388 MS
RBC # UR STRIP: ABNORMAL /HPF
REF LAB TEST METHOD: ABNORMAL
RH BLD: POSITIVE
RH BLD: POSITIVE
SP GR UR STRIP: 1.02 (ref 1–1.03)
SQUAMOUS #/AREA URNS HPF: ABNORMAL /HPF
T&S EXPIRATION DATE: NORMAL
UROBILINOGEN UR QL STRIP: ABNORMAL
WBC # UR STRIP: ABNORMAL /HPF

## 2023-03-09 PROCEDURE — 81001 URINALYSIS AUTO W/SCOPE: CPT | Performed by: INTERNAL MEDICINE

## 2023-03-09 PROCEDURE — 86900 BLOOD TYPING SEROLOGIC ABO: CPT

## 2023-03-09 PROCEDURE — 86901 BLOOD TYPING SEROLOGIC RH(D): CPT | Performed by: INTERNAL MEDICINE

## 2023-03-09 PROCEDURE — 93010 ELECTROCARDIOGRAM REPORT: CPT | Performed by: INTERNAL MEDICINE

## 2023-03-09 PROCEDURE — 83036 HEMOGLOBIN GLYCOSYLATED A1C: CPT

## 2023-03-09 PROCEDURE — 87086 URINE CULTURE/COLONY COUNT: CPT | Performed by: INTERNAL MEDICINE

## 2023-03-09 PROCEDURE — 86850 RBC ANTIBODY SCREEN: CPT | Performed by: INTERNAL MEDICINE

## 2023-03-09 PROCEDURE — 93005 ELECTROCARDIOGRAM TRACING: CPT

## 2023-03-09 PROCEDURE — 86900 BLOOD TYPING SEROLOGIC ABO: CPT | Performed by: INTERNAL MEDICINE

## 2023-03-09 PROCEDURE — 36415 COLL VENOUS BLD VENIPUNCTURE: CPT

## 2023-03-09 PROCEDURE — 87081 CULTURE SCREEN ONLY: CPT | Performed by: INTERNAL MEDICINE

## 2023-03-09 PROCEDURE — 86901 BLOOD TYPING SEROLOGIC RH(D): CPT

## 2023-03-09 NOTE — PAT
Pt here for PAT/joint camp visit.  Pre-op tests completed, chg soap given, and instructions reviewed.  Instructed clears until 2 hrs prior to arrival time, voiced understanding. Arrow pain pump brochure given and reviewed, voiced understanding. Pt reports nickel allergy, reaction to jewelry containing nickel in the past; notified office.

## 2023-03-09 NOTE — DISCHARGE INSTRUCTIONS
PRE-ADMISSION TESTING INSTRUCTIONS FOR TOTAL JOINT PATIENTS    Take these medications the morning of surgery with a small sip of water:   bupropion, lexapro, levothyroxine      No aspirin, advil, aleve, ibuprofen, naproxen, diet pills, decongestants, or vitamin/herbal supplements for a week prior to your surgery.  Stop vitamins 7 days prior to surgery.    Tylenol/Acetaminophen ok to take if needed.    Do not take any insulin or diabetes medications the morning of surgery.    Your surgeon may give you Bactroban to use prior to surgery. This will be started 5 days prior to surgery, follow the directions on your prescription from your surgeon for use.      General Instructions:    DO NOT EAT SOLID FOOD AFTER MIDNIGHT THE NIGHT BEFORE SURGERY. No gum, mints, or hard candy after midnight the night before surgery.  You may drink clear liquids the day of surgery up until 2 hours before your arrival time.  Clear liquids are liquids you can see through. Nothing RED in color.    Plain water    Sports drinks      Gelatin (Jell-O)  Fruit juices without pulp such as white grape juice and apple juice  Popsicles that contain no fruit or yogurt  Tea or coffee (no cream or milk added)    It is beneficial for you to have a clear drink that contains carbohydrates 2 hours prior to your arrival time.  DRINK A BOTTLE OF SPORTS DRINK 2 HOURS BEFORE YOUR ARRIVAL TIME. IF YOU ARE DIABETIC, DRINK A LOW OR NO SUGAR SPORTS DRINK. ANY COLOR EXCEPT RED.    Patients who avoid smoking, chewing tobacco and alcohol for 4 weeks prior to surgery have a reduced risk of post-operative complications.  If at all possible, quit smoking as many days before surgery as you can.    Do not smoke, use chewing tobacco or drink alcohol after midnight the day of surgery.    Bring your C-PAP/ BI-PAP machine if you use one.  Wear clean comfortable clothes and socks.  Do not wear contact lenses, lotion, deodorant, or make-up.  Bring a case for your glasses if  applicable.   You may brush your teeth the morning of surgery.  You may wear dentures/partials, do not put adhesive/glue on them.  Leave all other jewelry and valuables at home.  NOTIFY YOUR SURGEON IF YOU BECOME ILL, HAVE A FEVER, PRODUCTIVE COUGH, OR CANNOT BE HERE THE DAY OF SURGERY.      Preventing a Surgical Site Infection:    Shower the night before and on the morning of surgery using the chlorhexidine soap you were given.  Use a clean washcloth with the soap.  Place clean sheets on your bed after showering the night before surgery. Do not use the CHG soap on your hair, face, or private areas. Wash your body gently for five (5) minutes. Do not scrub your skin.  Dry with a clean towel and dress in clean clothing.    Do not shave the surgical area for 10 days-2 weeks prior to surgery  because the razor can irritate skin and make it easier to develop an infection.  Make sure you, your family, and all healthcare providers clean their hands with soap and water or an alcohol based hand  before caring for you or your wound.      Day of surgery:    Your surgeon’s office will advise you of your arrival time for the day of surgery.    Upon arrival, a Pre-op nurse and Anesthesia provider will review your health history, obtain vital signs, and answer questions you may have. The anesthesia provider will also discuss the type of anesthesia that will be needed for your procedure, which may include general anesthesia. The only belongings needed at this time will be your home medications and if applicable your C-PAP/BI-PAP machine.  If you are staying overnight your family can leave the rest of your belongings in the car and bring them to your room later.  A Pre-op nurse will start an IV and you may receive medication in preparation for surgery, including something to help you relax.  Your family will be able to see you in the Pre-op area.  While you are in surgery your family should notify the waiting room   if they leave the waiting room area and provide a contact phone number.    If you have any questions, you can call the Pre-Admission Department at [phone] or your surgeon's office.    Please be aware that surgery does come with discomfort.  We want to make every effort to control your discomfort so please discuss any uncontrolled symptoms with your nurse.   Your doctor will most likely have prescribed pain medications.     You may have bruising or discomfort from the tourniquet used in surgery.     Please leave all luggage in the car the morning of surgery.  You will be transported to your hospital room following the recovery period.  Your family can get your luggage at that time.      You may receive a survey regarding the care you received. Your feedback is very important and will be used to collect the necessary data to help us to continue to provide excellent care.

## 2023-03-10 LAB
BACTERIA SPEC AEROBE CULT: NORMAL
MRSA SPEC QL CULT: NORMAL

## 2023-03-13 NOTE — CASE MANAGEMENT/SOCIAL WORK
Continued Stay Note  ALEXA Simmons     Patient Name: Sharonda Preciado  MRN: [id number]  Today's Date: 3/13/2023    Admit Date: (Not on file)    Plan: Home with family and OP PT at [facility]   Discharge Plan     Row Name 03/13/23 0849       Plan    Plan Home with family and OP PT at [facility]    Patient/Family in Agreement with Plan yes    Plan Comments CM spoke with patient regarding pre admission discharge planning for  her surgery with Dr. Meneses on 3/15/23. Patient states she lives alone in a first floor apartment with no steps to gain entry. She states she normally has no issues entering the home or maneuvering inside. She states that her friend Iza and daughter Chelsea will be able to assist at home and provide ride home at discharge. We discussed  her plan for PT and she states the would like OP PT and is agreeable for CM to schedule this appointment for her and would like to go to [facility]. We then discussed DME and she states she has a rolling walker and straight cane at home and a friend is going to lend her a BSC and denies the need for any other equipment at this time. Patient states she plans on returning home at discharge with her friend and daughter to help as needed and OP PT. LUIS called and spoke with Hazel at [facility] OP PT and she has scheduled an appointment for 3/17/2023 @ 3:00pm. CM will follow.               Discharge Codes    No documentation.                     Yina Rossi RN

## 2023-03-15 ENCOUNTER — ANESTHESIA (OUTPATIENT)
Dept: PERIOP | Facility: HOSPITAL | Age: 58
End: 2023-03-15
Payer: COMMERCIAL

## 2023-03-15 ENCOUNTER — APPOINTMENT (OUTPATIENT)
Dept: GENERAL RADIOLOGY | Facility: HOSPITAL | Age: 58
End: 2023-03-15
Payer: COMMERCIAL

## 2023-03-15 ENCOUNTER — HOSPITAL ENCOUNTER (OUTPATIENT)
Facility: HOSPITAL | Age: 58
Setting detail: HOSPITAL OUTPATIENT SURGERY
Discharge: HOME | End: 2023-03-15
Attending: INTERNAL MEDICINE | Admitting: INTERNAL MEDICINE
Payer: COMMERCIAL

## 2023-03-15 VITALS
HEART RATE: 90 BPM | SYSTOLIC BLOOD PRESSURE: 141 MMHG | DIASTOLIC BLOOD PRESSURE: 88 MMHG | WEIGHT: 199 LBS | RESPIRATION RATE: 15 BRPM | TEMPERATURE: 97.5 F | OXYGEN SATURATION: 96 % | BODY MASS INDEX: 36.4 KG/M2

## 2023-03-15 DIAGNOSIS — M17.11 PRIMARY OSTEOARTHRITIS OF RIGHT KNEE: ICD-10-CM

## 2023-03-15 PROCEDURE — 25010000002 EPINEPHRINE 1 MG/ML SOLUTION 1 ML VIAL: Performed by: INTERNAL MEDICINE

## 2023-03-15 PROCEDURE — 25010000002 MIDAZOLAM PER 1MG: Performed by: NURSE ANESTHETIST, CERTIFIED REGISTERED

## 2023-03-15 PROCEDURE — 97161 PT EVAL LOW COMPLEX 20 MIN: CPT

## 2023-03-15 PROCEDURE — 25010000002 KETOROLAC TROMETHAMINE PER 15 MG: Performed by: INTERNAL MEDICINE

## 2023-03-15 PROCEDURE — 25010000002 DEXAMETHASONE PER 1 MG: Performed by: NURSE ANESTHETIST, CERTIFIED REGISTERED

## 2023-03-15 PROCEDURE — 27447 TOTAL KNEE ARTHROPLASTY: CPT | Performed by: INTERNAL MEDICINE

## 2023-03-15 PROCEDURE — 25010000002 ONDANSETRON PER 1 MG: Performed by: NURSE ANESTHETIST, CERTIFIED REGISTERED

## 2023-03-15 PROCEDURE — C1713 ANCHOR/SCREW BN/BN,TIS/BN: HCPCS | Performed by: INTERNAL MEDICINE

## 2023-03-15 PROCEDURE — 73560 X-RAY EXAM OF KNEE 1 OR 2: CPT

## 2023-03-15 PROCEDURE — 25010000002 PROPOFOL 10 MG/ML EMULSION: Performed by: NURSE ANESTHETIST, CERTIFIED REGISTERED

## 2023-03-15 PROCEDURE — 25010000002 VANCOMYCIN HCL 1.25 G RECONSTITUTED SOLUTION 1 EACH VIAL: Performed by: INTERNAL MEDICINE

## 2023-03-15 PROCEDURE — 25010000002 ROPIVACAINE PER 1 MG: Performed by: INTERNAL MEDICINE

## 2023-03-15 PROCEDURE — C1776 JOINT DEVICE (IMPLANTABLE): HCPCS | Performed by: INTERNAL MEDICINE

## 2023-03-15 PROCEDURE — S0260 H&P FOR SURGERY: HCPCS | Performed by: INTERNAL MEDICINE

## 2023-03-15 PROCEDURE — 27447 TOTAL KNEE ARTHROPLASTY: CPT | Performed by: SPECIALIST/TECHNOLOGIST, OTHER

## 2023-03-15 PROCEDURE — 0 CEFAZOLIN SODIUM-DEXTROSE 2-3 GM-%(50ML) RECONSTITUTED SOLUTION: Performed by: INTERNAL MEDICINE

## 2023-03-15 PROCEDURE — 20985 CPTR-ASST DIR MS PX: CPT | Performed by: INTERNAL MEDICINE

## 2023-03-15 DEVICE — DEV CONTRL TISS STRATAFIX SPIRAL PDO BIDIR 1 36X36CM: Type: IMPLANTABLE DEVICE | Site: KNEE | Status: FUNCTIONAL

## 2023-03-15 DEVICE — CMT BONE PALACOS/MV MD/VISC 40GM: Type: IMPLANTABLE DEVICE | Site: KNEE | Status: FUNCTIONAL

## 2023-03-15 DEVICE — LEGION HIGHLY CROSS LINKED                                    POLYETHYLENE DISHED INSERT SIZE 3-4 9MM
Type: IMPLANTABLE DEVICE | Site: KNEE | Status: FUNCTIONAL
Brand: LEGION

## 2023-03-15 DEVICE — LEGION CRUCIATE RETAINING OXINIUM                                    FEMORAL SIZE 4 RIGHT
Type: IMPLANTABLE DEVICE | Site: KNEE | Status: FUNCTIONAL
Brand: LEGION

## 2023-03-15 DEVICE — IMPLANTABLE DEVICE: Type: IMPLANTABLE DEVICE | Site: KNEE | Status: FUNCTIONAL

## 2023-03-15 DEVICE — GENESIS II NON-POROUS TIBIAL                                    BASEPLATE SIZE 3 RIGHT
Type: IMPLANTABLE DEVICE | Site: KNEE | Status: FUNCTIONAL
Brand: GENESIS II

## 2023-03-15 DEVICE — DEV CONTRL TISS STRATAFIX SPIRAL MNCRYL UD 3/0 PLS 45CM: Type: IMPLANTABLE DEVICE | Site: KNEE | Status: FUNCTIONAL

## 2023-03-15 RX ORDER — LIDOCAINE HYDROCHLORIDE 10 MG/ML
0.5 INJECTION, SOLUTION EPIDURAL; INFILTRATION; INTRACAUDAL; PERINEURAL ONCE AS NEEDED
Status: DISCONTINUED | OUTPATIENT
Start: 2023-03-15 | End: 2023-03-15 | Stop reason: HOSPADM

## 2023-03-15 RX ORDER — ONDANSETRON 4 MG/1
4 TABLET, FILM COATED ORAL EVERY 8 HOURS PRN
Qty: 10 TABLET | Refills: 0 | Status: SHIPPED | OUTPATIENT
Start: 2023-03-15

## 2023-03-15 RX ORDER — ONDANSETRON 2 MG/ML
4 INJECTION INTRAMUSCULAR; INTRAVENOUS ONCE AS NEEDED
Status: DISCONTINUED | OUTPATIENT
Start: 2023-03-15 | End: 2023-03-15 | Stop reason: HOSPADM

## 2023-03-15 RX ORDER — DEXAMETHASONE SODIUM PHOSPHATE 4 MG/ML
8 INJECTION, SOLUTION INTRA-ARTICULAR; INTRALESIONAL; INTRAMUSCULAR; INTRAVENOUS; SOFT TISSUE ONCE
Status: COMPLETED | OUTPATIENT
Start: 2023-03-15 | End: 2023-03-15

## 2023-03-15 RX ORDER — CEFAZOLIN SODIUM 2 G/50ML
2 SOLUTION INTRAVENOUS ONCE
Status: COMPLETED | OUTPATIENT
Start: 2023-03-15 | End: 2023-03-15

## 2023-03-15 RX ORDER — SODIUM CHLORIDE 9 MG/ML
40 INJECTION, SOLUTION INTRAVENOUS AS NEEDED
Status: DISCONTINUED | OUTPATIENT
Start: 2023-03-15 | End: 2023-03-15 | Stop reason: HOSPADM

## 2023-03-15 RX ORDER — MAGNESIUM HYDROXIDE 1200 MG/15ML
LIQUID ORAL AS NEEDED
Status: DISCONTINUED | OUTPATIENT
Start: 2023-03-15 | End: 2023-03-15 | Stop reason: HOSPADM

## 2023-03-15 RX ORDER — SODIUM CHLORIDE 0.9 % (FLUSH) 0.9 %
10 SYRINGE (ML) INJECTION EVERY 12 HOURS SCHEDULED
Status: DISCONTINUED | OUTPATIENT
Start: 2023-03-15 | End: 2023-03-15 | Stop reason: HOSPADM

## 2023-03-15 RX ORDER — FENTANYL CITRATE 50 UG/ML
25 INJECTION, SOLUTION INTRAMUSCULAR; INTRAVENOUS
Status: DISCONTINUED | OUTPATIENT
Start: 2023-03-15 | End: 2023-03-15 | Stop reason: HOSPADM

## 2023-03-15 RX ORDER — CHLORHEXIDINE GLUCONATE 500 MG/1
CLOTH TOPICAL ONCE
Status: DISCONTINUED | OUTPATIENT
Start: 2023-03-15 | End: 2023-03-15 | Stop reason: HOSPADM

## 2023-03-15 RX ORDER — CEFAZOLIN SODIUM 2 G/50ML
2 SOLUTION INTRAVENOUS EVERY 8 HOURS
Status: DISCONTINUED | OUTPATIENT
Start: 2023-03-15 | End: 2023-03-15 | Stop reason: HOSPADM

## 2023-03-15 RX ORDER — DOXYCYCLINE HYCLATE 100 MG/1
100 CAPSULE ORAL 2 TIMES DAILY
Qty: 28 CAPSULE | Refills: 0 | Status: SHIPPED | OUTPATIENT
Start: 2023-03-15 | End: 2023-03-29

## 2023-03-15 RX ORDER — BUPIVACAINE HYDROCHLORIDE 2.5 MG/ML
INJECTION, SOLUTION EPIDURAL; INFILTRATION; INTRACAUDAL
Status: COMPLETED | OUTPATIENT
Start: 2023-03-15 | End: 2023-03-15

## 2023-03-15 RX ORDER — TRANEXAMIC ACID 10 MG/ML
1000 INJECTION, SOLUTION INTRAVENOUS ONCE
Status: COMPLETED | OUTPATIENT
Start: 2023-03-15 | End: 2023-03-15

## 2023-03-15 RX ORDER — FAMOTIDINE 10 MG/ML
20 INJECTION, SOLUTION INTRAVENOUS
Status: COMPLETED | OUTPATIENT
Start: 2023-03-15 | End: 2023-03-15

## 2023-03-15 RX ORDER — DEXMEDETOMIDINE HYDROCHLORIDE 100 UG/ML
INJECTION, SOLUTION INTRAVENOUS AS NEEDED
Status: DISCONTINUED | OUTPATIENT
Start: 2023-03-15 | End: 2023-03-15 | Stop reason: SURG

## 2023-03-15 RX ORDER — LIDOCAINE HYDROCHLORIDE 20 MG/ML
INJECTION, SOLUTION INFILTRATION; PERINEURAL AS NEEDED
Status: DISCONTINUED | OUTPATIENT
Start: 2023-03-15 | End: 2023-03-15 | Stop reason: SURG

## 2023-03-15 RX ORDER — KETAMINE HYDROCHLORIDE 10 MG/ML
INJECTION INTRAMUSCULAR; INTRAVENOUS AS NEEDED
Status: DISCONTINUED | OUTPATIENT
Start: 2023-03-15 | End: 2023-03-15 | Stop reason: SURG

## 2023-03-15 RX ORDER — PREGABALIN 75 MG/1
150 CAPSULE ORAL ONCE
Status: COMPLETED | OUTPATIENT
Start: 2023-03-15 | End: 2023-03-15

## 2023-03-15 RX ORDER — SODIUM CHLORIDE 0.9 % (FLUSH) 0.9 %
10 SYRINGE (ML) INJECTION AS NEEDED
Status: DISCONTINUED | OUTPATIENT
Start: 2023-03-15 | End: 2023-03-15 | Stop reason: HOSPADM

## 2023-03-15 RX ORDER — OXYCODONE AND ACETAMINOPHEN 7.5; 325 MG/1; MG/1
1 TABLET ORAL EVERY 6 HOURS PRN
Qty: 45 TABLET | Refills: 0 | Status: SHIPPED | OUTPATIENT
Start: 2023-03-15

## 2023-03-15 RX ORDER — LIDOCAINE HYDROCHLORIDE 20 MG/ML
INJECTION, SOLUTION EPIDURAL; INFILTRATION; INTRACAUDAL; PERINEURAL AS NEEDED
Status: DISCONTINUED | OUTPATIENT
Start: 2023-03-15 | End: 2023-03-15 | Stop reason: SURG

## 2023-03-15 RX ORDER — MELOXICAM 7.5 MG/1
15 TABLET ORAL ONCE
Status: COMPLETED | OUTPATIENT
Start: 2023-03-15 | End: 2023-03-15

## 2023-03-15 RX ORDER — BUPIVACAINE HYDROCHLORIDE 5 MG/ML
INJECTION, SOLUTION PERINEURAL
Status: COMPLETED | OUTPATIENT
Start: 2023-03-15 | End: 2023-03-15

## 2023-03-15 RX ORDER — MIDAZOLAM HYDROCHLORIDE 2 MG/2ML
1 INJECTION, SOLUTION INTRAMUSCULAR; INTRAVENOUS
Status: COMPLETED | OUTPATIENT
Start: 2023-03-15 | End: 2023-03-15

## 2023-03-15 RX ORDER — SENNA PLUS 8.6 MG/1
1 TABLET ORAL DAILY
Qty: 30 TABLET | Refills: 0 | Status: SHIPPED | OUTPATIENT
Start: 2023-03-15

## 2023-03-15 RX ORDER — SODIUM CHLORIDE, SODIUM LACTATE, POTASSIUM CHLORIDE, CALCIUM CHLORIDE 600; 310; 30; 20 MG/100ML; MG/100ML; MG/100ML; MG/100ML
9 INJECTION, SOLUTION INTRAVENOUS CONTINUOUS
Status: DISCONTINUED | OUTPATIENT
Start: 2023-03-15 | End: 2023-03-15 | Stop reason: HOSPADM

## 2023-03-15 RX ORDER — ASPIRIN 81 MG/1
81 TABLET ORAL EVERY 12 HOURS SCHEDULED
Status: DISCONTINUED | OUTPATIENT
Start: 2023-03-16 | End: 2023-03-15 | Stop reason: HOSPADM

## 2023-03-15 RX ORDER — ONDANSETRON 2 MG/ML
4 INJECTION INTRAMUSCULAR; INTRAVENOUS ONCE AS NEEDED
Status: COMPLETED | OUTPATIENT
Start: 2023-03-15 | End: 2023-03-15

## 2023-03-15 RX ORDER — SODIUM CHLORIDE, SODIUM LACTATE, POTASSIUM CHLORIDE, CALCIUM CHLORIDE 600; 310; 30; 20 MG/100ML; MG/100ML; MG/100ML; MG/100ML
100 INJECTION, SOLUTION INTRAVENOUS CONTINUOUS
Status: DISCONTINUED | OUTPATIENT
Start: 2023-03-15 | End: 2023-03-15 | Stop reason: HOSPADM

## 2023-03-15 RX ORDER — HYDROCODONE BITARTRATE AND ACETAMINOPHEN 5; 325 MG/1; MG/1
1 TABLET ORAL ONCE AS NEEDED
Status: COMPLETED | OUTPATIENT
Start: 2023-03-15 | End: 2023-03-15

## 2023-03-15 RX ORDER — ASPIRIN 81 MG/1
81 TABLET ORAL 2 TIMES DAILY
Qty: 60 TABLET | Refills: 0 | Status: SHIPPED | OUTPATIENT
Start: 2023-03-15

## 2023-03-15 RX ORDER — TRANEXAMIC ACID 10 MG/ML
1000 INJECTION, SOLUTION INTRAVENOUS ONCE
Status: DISCONTINUED | OUTPATIENT
Start: 2023-03-15 | End: 2023-03-15 | Stop reason: HOSPADM

## 2023-03-15 RX ORDER — PROPOFOL 10 MG/ML
VIAL (ML) INTRAVENOUS CONTINUOUS PRN
Status: DISCONTINUED | OUTPATIENT
Start: 2023-03-15 | End: 2023-03-15 | Stop reason: SURG

## 2023-03-15 RX ADMIN — MELOXICAM 15 MG: 7.5 TABLET ORAL at 06:29

## 2023-03-15 RX ADMIN — LIDOCAINE HYDROCHLORIDE 3 ML: 20 INJECTION, SOLUTION EPIDURAL; INFILTRATION; INTRACAUDAL; PERINEURAL at 07:00

## 2023-03-15 RX ADMIN — SODIUM CHLORIDE, POTASSIUM CHLORIDE, SODIUM LACTATE AND CALCIUM CHLORIDE 9 ML/HR: 600; 310; 30; 20 INJECTION, SOLUTION INTRAVENOUS at 06:27

## 2023-03-15 RX ADMIN — KETAMINE HYDROCHLORIDE 20 MG: 10 INJECTION, SOLUTION INTRAMUSCULAR; INTRAVENOUS at 07:38

## 2023-03-15 RX ADMIN — CEFAZOLIN SODIUM 2 G: 2 SOLUTION INTRAVENOUS at 07:39

## 2023-03-15 RX ADMIN — VANCOMYCIN HYDROCHLORIDE 1250 MG: 1.25 INJECTION, POWDER, LYOPHILIZED, FOR SOLUTION INTRAVENOUS at 06:27

## 2023-03-15 RX ADMIN — ONDANSETRON 4 MG: 2 INJECTION INTRAMUSCULAR; INTRAVENOUS at 06:31

## 2023-03-15 RX ADMIN — MIDAZOLAM HYDROCHLORIDE 1 MG: 1 INJECTION, SOLUTION INTRAMUSCULAR; INTRAVENOUS at 06:53

## 2023-03-15 RX ADMIN — MIDAZOLAM HYDROCHLORIDE 1 MG: 1 INJECTION, SOLUTION INTRAMUSCULAR; INTRAVENOUS at 06:45

## 2023-03-15 RX ADMIN — PROPOFOL 50 MCG/KG/MIN: 10 INJECTION, EMULSION INTRAVENOUS at 07:38

## 2023-03-15 RX ADMIN — FAMOTIDINE 20 MG: 10 INJECTION, SOLUTION INTRAVENOUS at 06:32

## 2023-03-15 RX ADMIN — LIDOCAINE HYDROCHLORIDE 3 ML: 20 INJECTION, SOLUTION EPIDURAL; INFILTRATION; INTRACAUDAL; PERINEURAL at 06:55

## 2023-03-15 RX ADMIN — BUPIVACAINE HYDROCHLORIDE 20 ML: 2.5 INJECTION, SOLUTION EPIDURAL; INFILTRATION; INTRACAUDAL at 07:00

## 2023-03-15 RX ADMIN — DEXMEDETOMIDINE 30 MCG: 100 INJECTION, SOLUTION, CONCENTRATE INTRAVENOUS at 06:58

## 2023-03-15 RX ADMIN — LIDOCAINE HYDROCHLORIDE 100 MG: 20 INJECTION, SOLUTION INFILTRATION; PERINEURAL at 07:38

## 2023-03-15 RX ADMIN — HYDROCODONE BITARTRATE AND ACETAMINOPHEN 1 TABLET: 5; 325 TABLET ORAL at 12:14

## 2023-03-15 RX ADMIN — PREGABALIN 150 MG: 75 CAPSULE ORAL at 06:29

## 2023-03-15 RX ADMIN — BUPIVACAINE HYDROCHLORIDE 20 ML: 2.5 INJECTION, SOLUTION EPIDURAL; INFILTRATION; INTRACAUDAL; PERINEURAL at 06:55

## 2023-03-15 RX ADMIN — DEXAMETHASONE SODIUM PHOSPHATE 8 MG: 4 INJECTION, SOLUTION INTRAMUSCULAR; INTRAVENOUS at 06:31

## 2023-03-15 RX ADMIN — DEXMEDETOMIDINE 10 MCG: 100 INJECTION, SOLUTION, CONCENTRATE INTRAVENOUS at 07:05

## 2023-03-15 RX ADMIN — BUPIVACAINE HYDROCHLORIDE 1.8 ML: 5 INJECTION, SOLUTION PERINEURAL at 07:14

## 2023-03-15 RX ADMIN — TRANEXAMIC ACID 1000 MG: 10 INJECTION, SOLUTION INTRAVENOUS at 07:41

## 2023-03-15 NOTE — DISCHARGE INSTRUCTIONS
Total Knee Joint Replacement Discharge Instructions:    I. ACTIVITIES:  1. Exercises:  Complete exercise program as taught by the hospital physical therapist 2 times per day  Exercise program will be advanced by the physical therapist  During the day be up ambulating every 2 hours (while awake) for short distances  Complete the ankle pump exercises at least 10 times per hour (while awake)  Elevate legs most of the day the first week post operatively and thereafter elevate legs when in bed and for at least 30 minutes during the day. Caution must be taken to avoid pillow placement under the bend of the knee as this can led to flexion contractures of the knee.  Use cold packs 20-30 minutes approximately 5 times per day. This should be done before and after completing your exercises and at any time you are experiencing pain/ stiffness in your operative extremity.      2. Activities of Daily Living:  No tub baths, hot tubs, or swimming pools for 4 weeks  May shower and let water run over the incision on post-operative day #5 if no drainage. Do not scrub or rub the incision. Simply let the water run over the incision and pat dry.    II. Restrictions  Do not cross legs or kneel  Your surgeon will discuss with you when you will be able to drive again.  Weight bearing is as tolerated  First week stay inside on even terrain. May go up and down stairs one stair at a time utilizing the hand rail  After one week, you may venture outside.    III. Precautions:  Everyone that comes near you should wash their hands  No elective dental, genital-urinary, or colon procedures or surgical procedures for 12 weeks after surgery unless absolutely necessary.   If dental work or surgical procedure is deemed absolutely necessary, you will need to contact your surgeon as you will need to take antibiotics 1 hour prior to any dental work (including teeth cleanings).  Please discuss with your surgeon prophylactic antibiotics as the length of time  this intervention will be necessary for you varies with each patient’s health history and situation.  Avoid sick people. If you must be around someone who is ill, they should wear a mask.  Avoid visits to the Emergency Room or Urgent Care unless you are having a life threatening event.   If ordered stockings are to be placed on in the morning and removed at night. Monitor the stockings to ensure that any swelling is not causing the stockings to become too tight. In this case, remove stockings immediately.    IV. INCISION CARE:  May remove the Ace wrap 2 days following surgery  The negative pressure dressing with the battery pack is waterproof and should remain in place for 7 days.  You may shower with the negative pressure dressing as it is waterproof  Following removal of the dressing on day 7 you may shower and allow water to run over the incision  No submersion of incision in water such as tubs pools hot tubs etc.  No creams or ointments to the incision  Do not touch or pick at the incision  Check incision/dressing every day and notify surgeon immediately if any of the following signs or symptoms are noted:  Increase in redness  Increase in swelling around the incision and of the entire extremity  Increase in pain  Drainage oozing from the incision  Pulling apart of the edges of the incision  Increase in overall body temperature (greater than 100.5 degrees)  Your surgeon will instruct you regarding suture or staple removal    V. Medications:   1. Anticoagulants: You will be discharged on an anticoagulant. This is a prophylactic medication that helps prevent blood clots during your post-operative period. The type and length of dosage varies based on your individual needs, procedure performed, and surgeon’s preference.  While taking the anticoagulant, you should avoid taking any additional aspirin, ibuprofen (Advil or Motrin), Aleve (Naprosyn) or other non-steroidal anti-inflammatory medications.   Notify surgeon  immediately if any bartolo bleeding is noted in the urine, stool, emesis, or from the nose or the incision. Blood in the stool will often appear as black rather than red. Blood in urine may appear as pink. Blood in emesis may appear as brown/black like coffee grounds.  You will need to apply pressure for longer periods of time to any cuts or abrasions to stop bleeding  Avoid alcohol while taking anticoagulants    2. Stool Softeners: You will be at greater risk of constipation after surgery due to being less mobile and the pain medications.   Take stool softeners as instructed by your surgeon while on pain medications. Over the counter Colace 100 mg 1-2 capsules twice daily.   If stools become too loose or too frequent, please decreases the dosage or stop the stool softener.  If constipation occurs despite use of stool softeners, you are to continue the stool softeners and add a laxative (Milk of Magnesia 1 ounce daily as needed)  Drink plenty of fluids, and eat fruits and vegetables during your recovery time    3. Pain Medications utilized after surgery are narcotics and the law requires that the following information be given to all patients that are prescribed narcotics:  CLASSIFICATION: Pain medications are called Opioids and are narcotics  LEGALITIES: It is illegal to share narcotics with others and to drive within 24 hours of taking narcotics  POTENTIAL SIDE EFFECTS: Potential side effects of opioids include: nausea, vomiting, itching, dizziness, drowsiness, dry mouth, constipation, and difficulty urinating.  POTENTIAL ADVERSE EFFECTS:   Opioid tolerance can develop with use of pain medications and this simply means that it requires more and more of the medication to control pain; however, this is seen more in patients that use opioids for longer periods of time.  Opioid dependence can develop with use of Opioids and this simply means that to stop the medication can cause withdrawal symptoms; however, this is  seen with patients that use Opioids for longer periods of time.  Opioid addiction can develop with use of Opioids and the incidence of this is very unlikely in patients who take the medications as ordered and stop the medications as instructed.  Opioid overdose can be dangerous, but is unlikely when the medication is taken as ordered and stopped when ordered. It is important not to mix opioids with alcohol or with and type of sedative such as Benadryl as this can lead to over sedation and respiratory difficulty.  DOSAGE:   Pain medications will need to be taken consistently for the first week to decrease pain and promote adequate pain relief and participation in physical therapy.  After the initial surgical pain begins to resolve, you may begin to decrease the pain medication. By the end of 6 weeks, you should be off of pain medications.  Refills will not be given by the office during evening hours, on weekends, or after 6 weeks post-op.  To seek refills on pain medications during the initial 6 week post-operative period, you must call the office 48 hours in advance to request the refill. The office will then notify you when to  the prescription. DO NOT wait until you are out of the medication to request a refill.    V. FOLLOW-UP VISITS:  You will need to follow up in the office with your surgeon in 2 weeks. Please call this number [phone] to schedule this appointment.  If you have any concerns or suspected complications prior to your follow up visit, please call your surgeons office. Do not wait until your appointment time if you suspect complications. These will need to be addressed in the office promptly.

## 2023-03-15 NOTE — OP NOTE
OPERATIVE REPORT    Date of Surgery:   03/15/23    Attending Surgeon:   Jeremy Meneses MD, MSE    ASSISTANTS:    Assistant: Jose Rolle CSA was responsible for performing the following activities: Retraction, Suction, Closing and Placing Dressing and their skilled assistance was necessary for the success of this case.     PREOPERATIVE DIAGNOSIS:   Right knee osteoarthritis    POSTOPERATIVE DIAGNOSIS:   Same as above     PROCEDURE:   1. Right primary total knee arthroplasty, with robotic assistance (CORI)    Surgical Approach:  Surgical Approach: Knee Medial Parapatellar        IMPLANTS:   : [employer]  Brand: Legion TKS  Tibial component size: 3  Femoral component size: 4  Patellar Button: none  Bearing type: dished  Insert Thickness: 9 mm    SURGICAL DETAILS:  Preoperative Passive Range of Motion: 12 to 136 degrees  Postoperative Passive Range of Motion: 1 to 127 degrees  Pre Op 8 degrees Varus,  Post Op 1 degrees Varus   Incision/arthrotomy type: Medial parapatellar  Posterior Cruciate Ligament: Retained  Lateral release: No  Estimated blood loss: 175 cc  Anesthesia type: Spinal and Regional  Tourniquet: Yes: 250 mmHG    INDICATIONS FOR PROCEDURE:  This patient presents today with end stage degenerative joint disease of the knee. The patient has failed non-operative treatment and presents for total knee replacement. Risks, complications, and benefits of the procedure have been discussed and all questions have been answered preoperatively.    PROCEDURE:  The patient was brought to the operating room and placed on the operating room table in the supine position. After anesthesia was established, the patient was positioned supine. Bony prominences were padded. The patient was given prophylactic antibiotics within one hour of skin incision. The involved lower extremity was prepped and draped in usual sterile fashion.  Surgical timeout was taken to confirm the operative side and planned  procedure.    A straight incision was used medial to the midline carried through the subcutaneous tissue to the underlying extensor mechanism. A medial parapatellar approach was utilized. The tibial and femoral reference arrays were assembled first. 2 drill tip threaded pins were placed medial to the tibial tubercle within the surgical field. The pins were inserted under power using the drill guide included in the CORI set for their relative positions. Similarly, the 2 femoral pins were placed medially in the femur just proxmial to the medial epicondyle. The sensor arrays were assembled to the pins    The patella was treated with a lateral facetectomy. It was everted, a lateral facetectomy was performed, marginal osteophytes trimmed,  and the synovial margin was cauterized, clearing excess synovium.    The ACL was resected. Osteophytes were removed from the tibia and femur. A small medial capsular peel was performed.    The hip center was registered. The medial and lateral malleoli were registered. The femur and tibia were registered.    The knee motion and balance was assessed with standard poses. Pre-operative cut planning was adjusted to provide knee balance throughout the range of motion    We then turned out attention to the distal femur and using the CORI bur the distal femur resection was performed.  2 bur holes were then placed and the distal femoral punch was introduced for the  holes for the 4 in 1 femoral cutting block.     We then turned to the tibia and using the probe with a flat disc attached to it the tibial cutting block was floated into place using robotic navication. It was held in place with 2 a to p pins and one cross pin.  Depth resection, varus/valgus, and tibial slope were again confirmed. We then performed our tibial resection with a Z retractor medially, bent homman laterally, and PCL retractor posteriorly.  The tibia resection was then removed.    Attention was then turned back to the  femur and the appropriately sized 4 in 1 femoral cutting block was malleted into place and secured with 2 convergent suck down pins.     A laminar  was placed and the medial and lateral menisci were excised. Posterior femoral osteophytes were removed with an osteotome. The bovie was used to cauterize the posterior knee capsule and meniscal remnants.     The tibial surface was then sized using the trial baseplate and secured with 2 pins.  Careful attention was taken to ensure it was aligned with the medial third of the tibial tubercle. The trial femur was then impacted into place and the appropriate sized trail poly was inserted.  Knee motion and balance were checked manually and with the robotic assistance. The patella was not resurfaced. The patella tracked well. The femoral lug drills were performed. The trial femoral components and poly were removed. The tracker pins and arrays were removed.    Attention was then turned to the tibial prep.  The keel drill and punch introduced.     The trial tibial implant was removed. All bone was then prepared with lavage and drying for preparation prior to final component placement. The final tibial component was cemented into position and excess cement was removed. The final femoral component was cemented into position and excess cement was removed. . The trial poly was placed. After cement curing, motion and stability was again tested. The final tibial insert was exchanged to the final tibial insert which was impacted into position.    The arthrotomy was closed with #1 PDS stratafix. The subcutaneous tissue was closed with 2-0 monocryl. The skin was closed with running 3-0 monocryl stratafix and dermabond a sterile FARHAD dressing was placed.      The patient tolerated the procedure well and was taken to the recovery room in stable condition.    POSTOPERATIVE PLAN:   1. Weightbearing as tolerated on operative extremity  2. DVT prophylaxis    WOUND CLOSURE:  #1 PDS  Stratfix  2-0 monocryl subcuticular   3-0 monocryl stratafix skin  dermabond  FARHAD dressing      SPONGE/INSTRUMENT/NEEDLE COUNTS:   Correct x 2    CONDITION ON DISCHARGE:   Stable    COMPLICATIONS:   none  Jeremy Meneess MD, MSE

## 2023-03-15 NOTE — ANESTHESIA PROCEDURE NOTES
Peripheral Block    Pre-sedation assessment completed: 3/15/2023 6:51 AM    Patient reassessed immediately prior to procedure    Patient location during procedure: pre-op  Start time: 3/15/2023 7:00 AM  Stop time: 3/15/2023 7:05 AM  Reason for block: at surgeon's request and post-op pain management  Performed by  CRNA/CAA: Kary Kraft, KYLESRNA: Ivonne Haynes SRNA  Preanesthetic Checklist  Completed: patient identified, IV checked, site marked, risks and benefits discussed, surgical consent, monitors and equipment checked, pre-op evaluation and timeout performed  Prep:  Pt Position: supine  Sterile barriers:cap, gloves, mask and washed/disinfected hands  Prep: ChloraPrep  Patient monitoring: blood pressure monitoring, continuous pulse oximetry and EKG  Procedure    Sedation: yes  Performed under: local infiltration  Guidance:ultrasound guided    ULTRASOUND INTERPRETATION.  Using ultrasound guidance a 21 G gauge needle was placed in close proximity to the nerve, at which point, under ultrasound guidance anesthetic was injected in the area of the nerve and spread of the anesthesia was seen on ultrasound in close proximity thereto.  There were no abnormalities seen on ultrasound; a digital image was taken; and the patient tolerated the procedure with no complications. Images:still images obtained, printed/placed on chart    Laterality:right  Block Type:iPack  Injection Technique:single-shot  Needle Type:echogenic  Needle Gauge:21 G  Resistance on Injection: none    Medications Used: bupivacaine PF (MARCAINE) injection 0.25% - Injection   20 mL - 3/15/2023 7:00:00 AM      Medications  Comment:10 mcg of precedex added    Post Assessment  Injection Assessment: negative aspiration for heme, no paresthesia on injection and incremental injection  Patient Tolerance:comfortable throughout block  Complications:no

## 2023-03-15 NOTE — H&P
[facility]   HISTORY AND PHYSICAL    Patient Name: Sharonda Preciado  : 1965  MRN: [id number]  Primary Care Physician:  Montse Vela MD  Date of admission: 3/15/2023    Subjective   Subjective     Chief Complaint: right knee pain    History of Present Illness  Ms. Preciado is a very pleasant 57-year-old female has been a longtime patient of Dr. Gerber and has been treated for bilateral right greater than left knee arthritis with multiple rounds of corticosteroid and gel injections.  Most recently she has lost about 40 pounds in preparation for knee replacement.  She states that her knee pain is bad bilaterally but worse in her right knee.  She says that her knee pain has become quite problematic and is significantly limited her ability to continue her weight loss journey and enjoy life and perform her activities of daily living.  She was seen by myself in the office earlier this month and indicated and scheduled for a right total knee arthroplasty.      Review of Systems   Musculoskeletal: Positive for arthralgias, gait problem, joint swelling and myalgias.   All other systems reviewed and are negative.       Personal History     Past Medical History:   Diagnosis Date   • Anemia    • Anticardiolipin antibody positive    • Antiphospholipid antibody positive    • Hypertension    • Hypothyroid    • Knee swelling    • Sleep apnea with use of continuous positive airway pressure (CPAP)        Past Surgical History:   Procedure Laterality Date   •  SECTION     • COLONOSCOPY     • DILATATION AND CURETTAGE         Family History: family history includes Diabetes in her mother; Heart disease in her father and mother; Rheumatologic disease in her maternal grandmother and maternal uncle. Otherwise pertinent FHx was reviewed and not pertinent to current issue.    Social History:  reports that she has never smoked. She has never used smokeless tobacco. She reports that she does not drink alcohol and does not use  drugs.    Home Medications:  Multiple Vitamins-Minerals, Semaglutide-Weight Management, amphetamine-dextroamphetamine, buPROPion XL, ergocalciferol, escitalopram, fluticasone, levothyroxine, loratadine, and triamterene-hydrochlorothiazide    Allergies:  Allergies   Allergen Reactions   • Nickel Swelling     Ears swell, skin discolors from nickel containing jewelery   • Shellfish Allergy Unknown - Low Severity     Allergy test unknown reaction okay with betadine and iodine       Objective    Objective     Vitals:   Temp:  [97.5 °F (36.4 °C)] 97.5 °F (36.4 °C)  Heart Rate:  [86] 86  Resp:  [21] 21  BP: (164)/(103) 164/103    Physical Exam    Right Knee Exam      Tenderness   The patient is experiencing tenderness in the medial joint line, medial retinaculum and patella.     Range of Motion   Extension: 0   Flexion: 120      Tests   Darrell:  Medial - negative Lateral - negative  Varus: negative Valgus: negative  Drawer:  Anterior - negative    Posterior - negative     Other   Erythema: absent  Scars: absent  Sensation: normal  Pulse: present  Swelling: none  Effusion: no effusion present     Comments:  Varus alignment rigid        Left Knee Exam      Tenderness   The patient is experiencing tenderness in the medial retinaculum, medial joint line and patella.     Range of Motion   Extension: 0   Flexion: 120      Tests   Darrell:  Medial - negative Lateral - negative  Varus: negative Valgus: negative  Drawer:  Anterior - negative     Posterior - negative     Other   Erythema: absent  Scars: absent  Sensation: normal  Pulse: present  Effusion: no effusion present     Comments:  Rigid varus alignment  Result Review    Result Review:  I have personally reviewed the results from the time of this admission to 3/15/2023 06:57 EDT and agree with these findings:  []  Laboratory list / accordion  []  Microbiology  []  Radiology  []  EKG/Telemetry   []  Cardiology/Vascular   []  Pathology  []  Old records  [x]  Other:  Most  notable findings include:   Imaging: 3 standing views of bilateral knees   Indication: Right greater than left knee pain  Findings: X-rays demonstrate bilateral severe medial and patellofemoral joint osteoarthritis with complete loss of joint space medially, large medial and posterior femoral condyle osteophytes.  Patellofemoral joint shows complete loss of joint space.  Lateral joint space is lesser involved.  Comparative studies: None    Assessment & Plan   Assessment / Plan     Brief Patient Summary:  Sharonda Preciado is a 57 y.o. female who is developed bilateral severe OA right greater than left    Active Hospital Problems:  Active Hospital Problems    Diagnosis    • **Primary osteoarthritis of right knee      Plan:   Cc: f/u right knee pain, DJD    Patient presented to clinic today for preoperative history and physical visit in anticipation of upcoming scheduled right total knee arthroplasty.    I reviewed anatomy and a model of a total knee arthroplasty, as well as typical postoperative recovery of 6-12 months before maximal recovery, and possible need for rehabilitation stay after hospitalization. We also discussed risks, benefits, and alternatives of procedure with risks including but not limited to neurovascular damage, bleeding, infection, malalignment, chronic pian, failure of implants, osteolysis, loosening of implants, loss of motion, weakness, stiffness, instability, DVT, pulmonary embolus, death, stroke, complex regional pain syndrome, myocardial infarction, and need for additional procedures. Patient understood all these and had all questions answered before consenting to the procedure. No guarantees were given in regards to results from the surgery.  Patient has been medically optimize by his primary care physician.    Plan for right total knee arthroplasty.    Implants: [employer] cemented Legion TKS with [employer]  Anticoagulation: Eliquis  Antibiotics: Cefazolin and Vanc  Admission Type:  Same Day  Post op ABX: Yes  TXA: No    All remaining questions answered today.        DVT prophylaxis:  Mechanical DVT prophylaxis orders are present.    CODE STATUS:       Admission Status:  I believe this patient meets outpatient status.    Jeremy Meneses MD

## 2023-03-15 NOTE — ANESTHESIA PREPROCEDURE EVALUATION
Anesthesia Evaluation     Patient summary reviewed and Nursing notes reviewed   no history of anesthetic complications:  NPO Solid Status: > 8 hours  NPO Liquid Status: > 2 hours           Airway   Mallampati: II  TM distance: >3 FB  Neck ROM: full  No difficulty expected  Dental - normal exam     Pulmonary - normal exam    breath sounds clear to auscultation  (+) sleep apnea on CPAP,   Cardiovascular - normal exam  Exercise tolerance: good (4-7 METS)    ECG reviewed  Rhythm: regular  Rate: normal    (+) hypertension well controlled,     ROS comment: 3/9/23: EKG    HEART RATE= 81  bpm  RR Interval= 740  ms  IL Interval= 175  ms  P Horizontal Axis= -5  deg  P Front Axis= 62  deg  QRSD Interval= 103  ms  QT Interval= 388  ms  QRS Axis= 38  deg  T Wave Axis= 37  deg  - NORMAL ECG -  Sinus rhythmHEART RATE= 81  bpm  RR Interval= 740  ms  IL Interval= 175  ms  P Horizontal Axis= -5  deg  P Front Axis= 62  deg  QRSD Interval= 103  ms  QT Interval= 388  ms  QRS Axis= 38  deg  T Wave Axis= 37  deg  - NORMAL ECG -  Sinus rhythm    Neuro/Psych  (+) psychiatric history Anxiety and Depression,    GI/Hepatic/Renal/Endo    (+) obesity,   thyroid problem hypothyroidism    Musculoskeletal     Abdominal  - normal exam   Substance History - negative use     OB/GYN negative ob/gyn ROS         Other   arthritis, autoimmune disease (not on blood thinners; had clots in uterine artery during pregnancy many years ago) lupus,                      Anesthesia Plan    ASA 3     regional, spinal and MAC     intravenous induction     Anesthetic plan, risks, benefits, and alternatives have been provided, discussed and informed consent has been obtained with: patient.  Pre-procedure education provided  Use of blood products discussed with patient  Consented to blood products.   Plan discussed with CRNA.        CODE STATUS:

## 2023-03-15 NOTE — THERAPY DISCHARGE NOTE
Patient Name: Sharonda Preciado  : 1965    MRN: [id number]                              Today's Date: 3/15/2023       Admit Date: 3/15/2023    Visit Dx:     ICD-10-CM ICD-9-CM   1. Primary osteoarthritis of right knee  M17.11 715.16     Patient Active Problem List   Diagnosis   • Depression   • Edema   • Fatigue   • Fibromyalgia   • Hypertension   • Hypothyroidism   • Insomnia   • Knee pain   • Auditory vertigo   • Adiposity   • Vitamin D deficiency   • Acute URI   • Primary osteoarthritis of right knee     Past Medical History:   Diagnosis Date   • Anemia    • Anticardiolipin antibody positive    • Antiphospholipid antibody positive    • Hypertension    • Hypothyroid    • Knee swelling    • Sleep apnea with use of continuous positive airway pressure (CPAP)      Past Surgical History:   Procedure Laterality Date   •  SECTION     • COLONOSCOPY     • DILATATION AND CURETTAGE        General Information     Row Name 03/15/23 1036          Physical Therapy Time and Intention    Document Type discharge evaluation/summary  -     Mode of Treatment physical therapy  -     Row Name 03/15/23 1036          General Information    Patient Profile Reviewed yes  -JW     Prior Level of Function independent:;all household mobility;community mobility  -     Existing Precautions/Restrictions no known precautions/restrictions  -     Barriers to Rehab none identified  -     Row Name 03/15/23 1036          Living Environment    People in Home alone  pt reports family/friends will be available to assist as needed  -     Row Name 03/15/23 1036          Home Main Entrance    Number of Stairs, Main Entrance none  -JW     Stair Railings, Main Entrance none  -     Row Name 03/15/23 1036          Cognition    Orientation Status (Cognition) oriented x 3  -           User Key  (r) = Recorded By, (t) = Taken By, (c) = Cosigned By    Initials Name Provider Type    Leonor Hanley, PT Physical Therapist                Mobility     [facility] Name 03/15/23 1036          Bed Mobility    Bed Mobility supine-sit;sit-supine  -     Supine-Sit [place] (Bed Mobility) supervision  -     Sit-Supine [place] (Bed Mobility) supervision  -     Assistive Device (Bed Mobility) bed rails;head of bed elevated  -[facility] Name 03/15/23 1036          Transfers    Comment, (Transfers) cues for hand placement  -JW     Row Name 03/15/23 1036          Sit-Stand Transfer    Sit-Stand [place] (Transfers) supervision  -     Assistive Device (Sit-Stand Transfers) walker, front-wheeled  -[facility] Name 03/15/23 1036          Gait/Stairs (Locomotion)    [place] Level (Gait) contact guard  -     Assistive Device (Gait) walker, front-wheeled  -     Ambulated day of surgery or within 4 hours of PACU discharge yes  -     Distance in Feet (Gait) 75  -     Deviations/Abnormal Patterns (Gait) base of support, narrow  -     Bilateral Gait Deviations forward flexed posture  -     Comment, (Gait/Stairs) pt able to manage direction changes without difficulty or balance loss  -JW     Row Name 03/15/23 1036          Mobility    Extremity Weight-bearing Status right lower extremity  -     Right Lower Extremity (Weight-bearing Status) weight-bearing as tolerated (WBAT)  -           User Key  (r) = Recorded By, (t) = Taken By, (c) = Cosigned By    Initials Name Provider Type     Leonor Foss, PT Physical Therapist               Obj/Interventions     [facility] Name 03/15/23 1036          Range of Motion Comprehensive    Comment, General Range of Motion left LE ROM WFL, right LE not formally tested  -JW     Row Name 03/15/23 1036          Strength Comprehensive (MMT)    Comment, General Manual Muscle Testing (MMT) Assessment left LE strength functional within task, right LE not formally tested  -JW     Row Name 03/15/23 1036          Balance    Comment, Balance CGA for standing balance with walker  -           User Key  (r) = Recorded  "By, (t) = Taken By, (c) = Cosigned By    Initials Name Provider Type    Leonor Hanley, PT Physical Therapist               Goals/Plan    No documentation.                Clinical Impression     Row Name 03/15/23 1036          Pain    Pretreatment Pain Rating 0/10 - no pain  -     Posttreatment Pain Rating 3/10  -JW     Pain Location - Side/Orientation Right  -     Pain Location incisional  -     Pain Location - knee  -     Pain Intervention(s) Cold applied;Repositioned  -     Row Name 03/15/23 1036          Plan of Care Review    Plan of Care Reviewed With patient;daughter  -     Outcome Evaluation Physical therapy evaluation complete.  Patient performs supine to sit with SBA and sit to stand with SBA. Patient performs gait with rolling walker x75 feet, CGA.  Patient able to manage direction changes without balance loss.  Reviewed LE exercises to perform at home.  Discussed continued use of rolling walker for mobility upon return home.  Patient reports no concerns regarding return home.  Recommend outpatient PT services.  -     Row Name 03/15/23 1036          Therapy Assessment/Plan (PT)    Patient/Family Therapy Goals Statement (PT) \"go home\"  -     Criteria for Skilled Interventions Met (PT) no problems identified which require skilled intervention  -     Therapy Frequency (PT) evaluation only  -     Row Name 03/15/23 1036          Positioning and Restraints    Pre-Treatment Position in bed  -     Post Treatment Position bed  -JW     In Bed supine;call light within reach;encouraged to call for assist;notified nsg  -           User Key  (r) = Recorded By, (t) = Taken By, (c) = Cosigned By    Initials Name Provider Type    Leonor Hanley, PT Physical Therapist               Outcome Measures     Row Name 03/15/23 1036          How much help from another person do you currently need...    Turning from your back to your side while in flat bed without using bedrails? 3  -     Moving " from lying on back to sitting on the side of a flat bed without bedrails? 3  -JW     Moving to and from a bed to a chair (including a wheelchair)? 3  -JW     Standing up from a chair using your arms (e.g., wheelchair, bedside chair)? 3  -JW     Climbing 3-5 steps with a railing? 3  -JW     To walk in hospital room? 3  -JW     AM-PAC 6 Clicks Score (PT) 18  -JW     Highest level of mobility 6 --> Walked 10 steps or more  -     Row Name 03/15/23 1036          Functional Assessment    Outcome Measure Options AM-PAC 6 Clicks Basic Mobility (PT)  -           User Key  (r) = Recorded By, (t) = Taken By, (c) = Cosigned By    Initials Name Provider Type    Leonor Hanley, FLORI Physical Therapist              Physical Therapy Education     Title: PT OT SLP Therapies (Resolved)     Topic: Physical Therapy (Resolved)     Point: Mobility training (Resolved)     Learning Progress Summary           Patient Acceptance, E,TB, VU by JERRY at 3/15/2023 1143                   Point: Home exercise program (Resolved)     Learning Progress Summary           Patient Acceptance, E,TB, VU by  at 3/15/2023 1143                               User Key     Initials Effective Dates Name Provider Type Discipline     06/16/21 -  Leonor Foss, FLORI Physical Therapist PT              PT Recommendation and Plan     Plan of Care Reviewed With: patient, daughter  Outcome Evaluation: Physical therapy evaluation complete.  Patient performs supine to sit with SBA and sit to stand with SBA. Patient performs gait with rolling walker x75 feet, CGA.  Patient able to manage direction changes without balance loss.  Reviewed LE exercises to perform at home.  Discussed continued use of rolling walker for mobility upon return home.  Patient reports no concerns regarding return home.  Recommend outpatient PT services.     Time Calculation:    PT Charges     Row Name 03/15/23 1144             Time Calculation    Start Time 1036  -      Stop Time 1055  -       Time Calculation (min) 19 min  -JERRY      PT Received On 03/15/23  -JERRY            User Key  (r) = Recorded By, (t) = Taken By, (c) = Cosigned By    Initials Name Provider Type    Leonor Hanley, FLORI Physical Therapist              Therapy Charges for Today     Code Description Service Date Service Provider Modifiers Qty    [id number] HC PT EVAL LOW COMPLEXITY 1 3/15/2023 Leonor Foss, FLORI GP 1          PT G-Codes  Outcome Measure Options: AM-PAC 6 Clicks Basic Mobility (PT)  AM-PAC 6 Clicks Score (PT): 18    PT Discharge Summary  Anticipated Discharge Disposition (PT): home with outpatient therapy services    Leonor Foss, FLORI  3/15/2023

## 2023-03-15 NOTE — ANESTHESIA POSTPROCEDURE EVALUATION
Patient: Sharonda Preciado    Procedure Summary     Date: 03/15/23 Room / Location:  [facility] OR 3 / [facility]    Anesthesia Start: 0729 Anesthesia Stop: 0941    Procedure: TOTAL KNEE ARTHROPLASTY WITH CORI ROBOT (Right: Knee) Diagnosis:       Primary osteoarthritis of right knee      (Primary osteoarthritis of right knee [M17.11])    Surgeons: Jeremy Meneses MD Provider: Kary Kraft CRNA    Anesthesia Type: regional, spinal, MAC ASA Status: 3          Anesthesia Type: regional, spinal, MAC    Vitals  Vitals Value Taken Time   /88 03/15/23 1300   Temp     Pulse 90 03/15/23 1300   Resp 15 03/15/23 1030   SpO2 96 % 03/15/23 1300           Post Anesthesia Care and Evaluation    Patient location during evaluation: bedside  Patient participation: complete - patient participated  Level of consciousness: awake and alert  Pain score: 0  Pain management: adequate    Airway patency: patent  Anesthetic complications: No anesthetic complications  PONV Status: none  Cardiovascular status: acceptable  Respiratory status: acceptable  Hydration status: acceptable  No anesthesia care post op

## 2023-03-15 NOTE — PLAN OF CARE
Goal Outcome Evaluation:  Plan of Care Reviewed With: patient, daughter           Outcome Evaluation: Physical therapy evaluation complete.  Patient performs supine to sit with SBA and sit to stand with SBA. Patient performs gait with rolling walker x75 feet, CGA.  Patient able to manage direction changes without balance loss.  Reviewed LE exercises to perform at home.  Discussed continued use of rolling walker for mobility upon return home.  Patient reports no concerns regarding return home.  Recommend outpatient PT services.

## 2023-03-15 NOTE — ANESTHESIA PROCEDURE NOTES
Peripheral Block    Pre-sedation assessment completed: 3/15/2023 6:51 AM    Patient reassessed immediately prior to procedure    Patient location during procedure: pre-op  Start time: 3/15/2023 6:55 AM  Stop time: 3/15/2023 6:58 AM  Reason for block: at surgeon's request and post-op pain management  Performed by  CRNA/CAA: Kary Kraft, KYLESRNA: Ivonne Haynes SRNA  Preanesthetic Checklist  Completed: patient identified, IV checked, site marked, risks and benefits discussed, surgical consent, monitors and equipment checked, pre-op evaluation and timeout performed  Prep:  Pt Position: supine  Sterile barriers:cap, gloves, mask and washed/disinfected hands  Prep: ChloraPrep  Patient monitoring: blood pressure monitoring, continuous pulse oximetry and EKG  Procedure    Sedation: yes  Performed under: local infiltration  Guidance:ultrasound guided    ULTRASOUND INTERPRETATION.  Using ultrasound guidance a 21 G gauge needle was placed in close proximity to the nerve, at which point, under ultrasound guidance anesthetic was injected in the area of the nerve and spread of the anesthesia was seen on ultrasound in close proximity thereto.  There were no abnormalities seen on ultrasound; a digital image was taken; and the patient tolerated the procedure with no complications. Images:still images obtained, printed/placed on chart    Laterality:right  Block Type:adductor canal block  Injection Technique:single-shot  Needle Type:echogenic  Needle Gauge:21 G  Resistance on Injection: none    Medications Used: bupivacaine PF (MARCAINE) injection 0.25% - Injection   20 mL - 3/15/2023 6:55:00 AM      Medications  Comment:Added precedex 30 mcg to ACB    Post Assessment  Injection Assessment: negative aspiration for heme, no paresthesia on injection and incremental injection  Patient Tolerance:comfortable throughout block  Complications:no

## 2023-03-15 NOTE — ANESTHESIA PROCEDURE NOTES
Spinal Block    Pre-sedation assessment completed: 3/15/2023 6:51 AM    Patient reassessed immediately prior to procedure    Start Time: 3/15/2023 7:14 AM  Stop Time: 3/15/2023 7:15 AM  Indication:at surgeon's request and post-op pain management  Performed By  KYLE/ANTHONY: Kary Kraft, KYLESRNA: Ivonne Haynes SRNA  Preanesthetic Checklist  Completed: patient identified, IV checked, site marked, risks and benefits discussed, surgical consent, monitors and equipment checked, pre-op evaluation and timeout performed  Spinal Block Prep:  Patient Position:sitting  Sterile Tech:cap, gloves, mask and sterile barriers  Prep:Chloraprep  Patient Monitoring:blood pressure monitoring, continuous pulse oximetry and EKG    Spinal Block Procedure  Approach:midline  Guidance:landmark technique and palpation technique  Location:L3-L4  Needle Type:Sprotte  Needle Gauge:25 G  Placement of Spinal needle event:cerebrospinal fluid aspirated  Paresthesia: no  Fluid Appearance:clear  Medications: bupivacaine (MARCAINE) injection 0.5% - Injection   1.8 mL - 3/15/2023 7:14:00 AM   Post Assessment  Patient Tolerance:patient tolerated the procedure well with no apparent complications  Complications no

## 2023-03-17 ENCOUNTER — TREATMENT (OUTPATIENT)
Dept: PHYSICAL THERAPY | Facility: CLINIC | Age: 58
End: 2023-03-17
Payer: COMMERCIAL

## 2023-03-17 DIAGNOSIS — M25.561 CHRONIC PAIN OF RIGHT KNEE: Primary | ICD-10-CM

## 2023-03-17 DIAGNOSIS — G89.29 CHRONIC PAIN OF RIGHT KNEE: Primary | ICD-10-CM

## 2023-03-17 DIAGNOSIS — R26.2 DIFFICULTY WALKING: ICD-10-CM

## 2023-03-17 PROCEDURE — 97110 THERAPEUTIC EXERCISES: CPT | Performed by: PHYSICAL THERAPIST

## 2023-03-17 PROCEDURE — 97162 PT EVAL MOD COMPLEX 30 MIN: CPT | Performed by: PHYSICAL THERAPIST

## 2023-03-17 NOTE — PROGRESS NOTES
Physical Therapy Initial Evaluation and Plan of Care    [facility]  [address] [place] [address]  [phone] (phone)  [phone] (fax)    Patient: Sharonda Preciado   : 1965  Diagnosis/ICD-10 Code:  Chronic pain of right knee [M25.561, G89.29]  Referring practitioner: Jeremy Meneses MD  Date of Initial Visit: 3/17/2023  Today's Date: 3/17/2023  Patient seen for 1 sessions           Subjective Evaluation    History of Present Illness  Date of onset: 3/15/2023  Mechanism of injury: Patient is 2 days s/p R TKA surgery. Patient has history of chronic knee pain due to bone on bone OA. She tried steroid injections and gel injections over the years to prolong the need for surgery.    Prior to surgery patient patient reports her knee pain was variable. Stairs were also very painful     Currently she is having trouble placing her full weight through her R LE (walking more on forefoot with RW). She describes her pain as achy, sharp, and throbbing.     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) Quality of life: good    Pain  Current pain ratin  At best pain ratin  At worst pain rating: 10  Location: R knee  Quality: sharp, tight, throbbing and dull ache  Relieving factors: ice, change in position, medications, relaxation, rest and support  Aggravating factors: squatting, ambulation, stairs, standing, lifting and repetitive movement  Progression: improved    Social Support  Lives in: apartment  Lives with: alone (daughter at , family in town from [place])    Hand dominance: right    Diagnostic Tests  X-ray: abnormal    Treatments  Previous treatment: injection treatment  Patient Goals  Patient goals for therapy: increased strength, decreased pain, return to sport/leisure activities, increased motion and improved balance             Objective           Observations     Right Knee   Positive for edema and incision.       Tenderness     Right Knee   Tenderness in the inferior patella, lateral joint line, lateral patella, medial joint line, medial patella, patellar tendon, quadriceps tendon and superior patella.     Neurological Testing     Sensation     Knee   Left Knee   Intact: light touch    Right Knee   Diminished: light touch     Active Range of Motion   Left Knee   Flexion: 123 degrees   Extension: 0 degrees     Right Knee   Flexion: 60 degrees   Extension: 18 degrees     Right Knee Flexibility Comments:   Hamstring and quad tightness    Swelling     Left Knee Girth Measurement (cm)   Joint line: 45 cm    Right Knee Girth Measurement (cm)   Joint line: 52 cm    Ambulation     Comments   Use of RW, significantly relying on UE support to unweight R LE. Lack of heel strike (placing all of her weight on forefoot of R LE).        See Exercise, Manual, and Modality Logs for complete treatment.       Functional Outcome Score: LEFS=2/80         Assessment & Plan     Assessment  Impairments: abnormal gait, abnormal muscle firing, abnormal or restricted ROM, activity intolerance, impaired balance, impaired physical strength, lacks appropriate home exercise program and pain with function  Functional Limitations: sleeping, walking, uncomfortable because of pain, sitting and standing  Assessment details: Sharonda Preciado is a 57 y.o. year-old female referred to physical therapy for Right knee pain s/p TKA surgery on 3/15/23. She presents with a evolving clinical presentation.  She has comorbidities of L knee OA, lupus, hypertension, anemia, and hypothyroidism and personal factors of living alone and relying on others for transportation may affect her progress in the plan of care.  Signs and symptoms are consistent with physical therapy diagnosis of right knee pain and difficulty walkin. Objective findings include impaired knee AROM and strength, antalgic gait, and  compromised balance. Pt was educated on course of treatment, possible reasons for knee pain, activity modifications, and use of ice/heat PRN. Pt was given a copy of HEP. Patient is appropriate for skilled physical therapy in order to reduce pain and increase ease with daily mobility.     Barriers to therapy: none identified  Prognosis: good    Goals  Plan Goals: STGs to be completed within 30 days:  -Patient will demonstrate compliance and independence with initial HEP  -Patient will increase R Knee AROM to 5-110 degrees to help normalize gait mechanics and increase ease with transfers  -Patient will perform sit to stand transfer with equilateral WB and no UE assistance  -Patient will ambulate household distances with SC in order to reduce reliance on UE support with gait    LTGs to be completed within 90 days:  -Patient will increase R Knee AROM to 0-120 degrees to help normalize gait mechanics and increase ease with transfers  -Patient will complete community mobility without AD and with even step length and heel-toe gait mechanics  -Patient will reduce edema in R knee by 2 cm to help reduce pain/discomfort  -Patient will improve score on LEFS from 2 at eval to 40 or greater to improve quality of life    Plan  Therapy options: will be seen for skilled therapy services  Planned modality interventions: TENS, ultrasound and electrical stimulation/Russian stimulation  Planned therapy interventions: joint mobilization, stretching, strengthening, therapeutic activities, transfer training, postural training, manual therapy, ADL retraining, balance/weight-bearing training, dressing changes, flexibility, functional ROM exercises, gait training, home exercise program, neuromuscular re-education and motor coordination training  Other planned therapy interventions: Aquatic Therapy  Frequency: 2x week (36 visits)  Treatment plan discussed with: patient  Plan details: Gait training, stairs, Balance, Knee ROM/strength, LE  stability        Timed:  Manual Therapy:         mins  10242;  Therapeutic Exercise:    25     mins  94075;     Neuromuscular Hari:        mins  69766;    Therapeutic Activity:          mins  42458;     Gait Trainin     mins  97319;     Ultrasound:          mins  17800;    Iontophoresis         mins 62757    Untimed:  Electrical Stimulation:         mins  04928 ( );  Traction:       mins  89996;   Dry Needling   (1-2 muscles)   _     mins 33100 (Self-pay)  Dry Needling (3-4 muscles)  _     mins 08748 (Self-pay)  Dry Needling Trial    _     mins DRYNDLTRIAL  (No Charge)  Low Eval          Mins  39371  Mod Eval     20     Mins  25050  High Eval                            Mins  21132  Re- Eval                           Mins  82504    Timed Treatment:   27   mins   Total Treatment:     60   mins    PT SIGNATURE: Odette Arrieta PT     License Number: [place] [id number]    Electronically signed by Odette Arrieta PT, 23, 3:07 PM EDT    DATE TREATMENT INITIATED: 3/17/2023    Initial Certification  Certification Period: 6/15/2023  I certify that the therapy services are furnished while this patient is under my care.  The services outlined above are required by this patient, and will be reviewed every 90 days.     PHYSICIAN: Jeremy Meneses MD   NPI: [id number]                                         DATE:     Please sign and return via fax to [phone] Thank you, [facility].

## 2023-03-20 ENCOUNTER — TREATMENT (OUTPATIENT)
Dept: PHYSICAL THERAPY | Facility: CLINIC | Age: 58
End: 2023-03-20
Payer: COMMERCIAL

## 2023-03-20 DIAGNOSIS — G89.29 CHRONIC PAIN OF RIGHT KNEE: Primary | ICD-10-CM

## 2023-03-20 DIAGNOSIS — M25.561 CHRONIC PAIN OF RIGHT KNEE: Primary | ICD-10-CM

## 2023-03-20 DIAGNOSIS — R26.2 DIFFICULTY WALKING: ICD-10-CM

## 2023-03-20 PROCEDURE — 97140 MANUAL THERAPY 1/> REGIONS: CPT | Performed by: PHYSICAL THERAPIST

## 2023-03-20 PROCEDURE — 97110 THERAPEUTIC EXERCISES: CPT | Performed by: PHYSICAL THERAPIST

## 2023-03-20 NOTE — PROGRESS NOTES
Physical Therapy Daily Treatment Note    Patient: Sharonda Preciado   : 1965  Referring practitioner: Jeremy Meneses MD  Date of Initial Visit: Type: THERAPY  Noted: 3/17/2023  Today's Date: 3/20/2023  Patient seen for 2 sessions       Visit Diagnoses:    ICD-10-CM ICD-9-CM   1. Chronic pain of right knee  M25.561 719.46    G89.29 338.29   2. Difficulty walking  R26.2 719.7       Sharonda Preciado reports: her knee felt pretty good this weekend, but today it is pretty stiff.      Subjective       Objective          Active Range of Motion     Right Knee   Flexion: 90 degrees       Pt enters department WBAT right LE using her RWX minimal antalgic gait, but has limited knee flex.  Pt with right knee post-op dressing intact without signs of drainage.  Pt with minimal right proximal tibia ecchymosis present.      See Exercise, Manual, and Modality Logs for complete treatment.       Assessment & Plan     Assessment    Assessment details: Pt is responding to treatment with improving right knee AROM after passive stretching.  Pt reported less tightness after using the Nustep prior to exercises.  Added knee flex passive stretching, TB vs knee flex, TB vs TKE, and standing toe/heel raises.  Pt completed all exercises and stretches with minimal c/o pain.        Progress per Plan of Care      Timed:         Manual Therapy:    8     mins  46105;     Therapeutic Exercise:   37      mins  49963;     Neuromuscular Hari:        mins  61796;    Therapeutic Activity:          mins  35376;     Gait Training:           mins  54452;     Ultrasound:          mins  60066;    Ionto                                   mins   49045  Self Care                            mins   54600  Canalith Repos         mins 30186      Un-Timed:  Electrical Stimulation:         mins  37277 ( );  Dry Needling          mins self-pay  Traction          mins 12784      Timed Treatment:  45    mins   Total Treatment:     60   mins    Kary Pichardo  PT  [place] License: [id number]

## 2023-03-22 ENCOUNTER — TREATMENT (OUTPATIENT)
Dept: PHYSICAL THERAPY | Facility: CLINIC | Age: 58
End: 2023-03-22
Payer: COMMERCIAL

## 2023-03-22 DIAGNOSIS — R26.2 DIFFICULTY WALKING: ICD-10-CM

## 2023-03-22 DIAGNOSIS — M25.561 CHRONIC PAIN OF RIGHT KNEE: Primary | ICD-10-CM

## 2023-03-22 DIAGNOSIS — G89.29 CHRONIC PAIN OF RIGHT KNEE: Primary | ICD-10-CM

## 2023-03-22 PROCEDURE — 97140 MANUAL THERAPY 1/> REGIONS: CPT | Performed by: PHYSICAL THERAPIST

## 2023-03-22 PROCEDURE — 97110 THERAPEUTIC EXERCISES: CPT | Performed by: PHYSICAL THERAPIST

## 2023-03-22 NOTE — PATIENT INSTRUCTIONS
Access Code: [email or web address]  URL: [email or web address]  Date: 03/22/2023  Prepared by: Amber Mendoza    Exercises  - Supine Active Straight Leg Raise  - 2 x daily - 7 x weekly - 1 sets - 10 reps - 3 hold    Patient Education  - Reciprocal Gait with Cane

## 2023-03-22 NOTE — PROGRESS NOTES
Physical Therapy Daily Treatment Note      Patient: Sharonda Preciado   : 1965  Referring practitioner: Jeremy Meneses MD  Date of Initial Visit: Type: THERAPY  Noted: 3/17/2023  Today's Date: 3/22/2023  Patient seen for 3 sessions       Visit Diagnoses:    ICD-10-CM ICD-9-CM   1. Chronic pain of right knee  M25.561 719.46    G89.29 338.29   2. Difficulty walking  R26.2 719.7       Subjective   Sharonda Preciado reports: my knee is feeling stiff this morning.  Had difficulty sleeping last night; I'm off my pain meds.  Not really needing walker and though knee hasn't buckled, wants to be cautious.  Has a cane at home but hasn't used it.       Objective   See Exercise, Manual, and Modality Logs for complete treatment.   No palp tenderness gastroc/popliteal fossa   Seated knee flex AAROM - 95 deg    Assessment/Plan   Pt is progressing very well s/p R TKA with good quad control and minimal extensor lag and demonstrated safe ambulation with SPC cane within clinic.  Her pain is well control without narcotics. Pt was issued updated HEP printout to facilitate compliance and recall with ex progressions today.  Progress per Plan of Care             Timed:         Manual Therapy:   8      mins  94004;     Therapeutic Exercise:   25      mins  81741;     Neuromuscular Hari:        mins  40771;    Therapeutic Activity:          mins  38530;     Gait Trainin     mins  29450;     Ultrasound:          mins  92663;    Ionto                                   mins   94214  Self Care                            mins   69841    Timed Treatment:  38    mins   Total Treatment:     50   mins    Amber Mendoza PT  [place] License: #[id number]

## 2023-03-24 ENCOUNTER — TREATMENT (OUTPATIENT)
Dept: PHYSICAL THERAPY | Facility: CLINIC | Age: 58
End: 2023-03-24
Payer: COMMERCIAL

## 2023-03-24 DIAGNOSIS — G89.29 CHRONIC PAIN OF RIGHT KNEE: Primary | ICD-10-CM

## 2023-03-24 DIAGNOSIS — M25.561 CHRONIC PAIN OF RIGHT KNEE: Primary | ICD-10-CM

## 2023-03-24 DIAGNOSIS — R26.2 DIFFICULTY WALKING: ICD-10-CM

## 2023-03-24 PROCEDURE — 97140 MANUAL THERAPY 1/> REGIONS: CPT | Performed by: PHYSICAL THERAPIST

## 2023-03-24 PROCEDURE — 97530 THERAPEUTIC ACTIVITIES: CPT | Performed by: PHYSICAL THERAPIST

## 2023-03-24 PROCEDURE — 97110 THERAPEUTIC EXERCISES: CPT | Performed by: PHYSICAL THERAPIST

## 2023-03-24 NOTE — PROGRESS NOTES
Physical Therapy Daily Treatment Note    [facility]  [address] [place] [address]  [phone] (phone)  [phone] (fax)    Patient: Sahronda Preciado   : 1965  Diagnosis/ICD-10 Code:  Chronic pain of right knee [M25.561, G89.29]  Referring practitioner: Jeremy Meneses MD  Date of Initial Visit: Type: THERAPY  Noted: 3/17/2023  Today's Date: 3/24/2023  Patient seen for 4 sessions           Subjective   Patient reports she has started using the cane but she still uses the RW at times if she is feeling a little more unsteady.    Objective     See Exercise, Manual, and Modality Logs for complete treatment.     R knee flexion PROM=103 degrees    Assessment/Plan  Patient has good pain tolerance with PROM stretches. She is relying less on the cane for un-weighting of R LE and utilizing it more for generalized balance. She still has some mild antalgia due to lack of full R knee extension AROM. Progressed hip abduction strengthening into sidelying position. Also added squats and cued for patient to try and maintain equal WB         Timed:    Manual Therapy:    8     mins  54599;  Therapeutic Exercise:    24     mins  84921;     Neuromuscular Hari:        mins  25150;    Therapeutic Activity:     8     mins  02662;     Gait Training:           mins  63499;     Ultrasound:          mins  88221;    Electrical Stimulation:         mins  16092 ( );  Iontophoresis         mins 75104;  Aquatic Therapy         mins 08300;    Untimed:  Electrical Stimulation:         mins  35984 ( );  Traction:         mins  95501;   Dry Needling   (1-2 muscles)       mins  (Self-pay)  Dry Needling (3-4 muscles)        mins  (Self-pay)  Dry Needling Trial          mins DRYNDLTRIAL  (No Charge)    Timed Treatment:   40   mins   Total Treatment:     55   mins    Odette Arrieta PT  Physical Therapist    [place] License:[id number]

## 2023-03-27 ENCOUNTER — TREATMENT (OUTPATIENT)
Dept: PHYSICAL THERAPY | Facility: CLINIC | Age: 58
End: 2023-03-27
Payer: COMMERCIAL

## 2023-03-27 DIAGNOSIS — G89.29 CHRONIC PAIN OF RIGHT KNEE: Primary | ICD-10-CM

## 2023-03-27 DIAGNOSIS — M25.561 CHRONIC PAIN OF RIGHT KNEE: Primary | ICD-10-CM

## 2023-03-27 DIAGNOSIS — R26.2 DIFFICULTY WALKING: ICD-10-CM

## 2023-03-27 PROCEDURE — 97530 THERAPEUTIC ACTIVITIES: CPT | Performed by: PHYSICAL THERAPIST

## 2023-03-27 PROCEDURE — 97110 THERAPEUTIC EXERCISES: CPT | Performed by: PHYSICAL THERAPIST

## 2023-03-27 PROCEDURE — 97140 MANUAL THERAPY 1/> REGIONS: CPT | Performed by: PHYSICAL THERAPIST

## 2023-03-27 NOTE — PROGRESS NOTES
Physical Therapy Daily Treatment Note    Patient: Sharonda Preciado   : 1965  Referring practitioner: Jeremy Meneses MD  Date of Initial Visit: Type: THERAPY  Noted: 3/17/2023  Today's Date: 3/27/2023  Patient seen for 5 sessions       Visit Diagnoses:    ICD-10-CM ICD-9-CM   1. Chronic pain of right knee  M25.561 719.46    G89.29 338.29   2. Difficulty walking  R26.2 719.7       Sharonda Preciado reports: her knee is really painful today and states today is the worst it has been in a few days.  Pt rates her pain a 3-4/10.      Subjective       Objective          Active Range of Motion     Right Knee   Extension: 4 degrees     Additional Active Range of Motion Details  Right knee flexion supine AAROM 106 degrees       See Exercise, Manual, and Modality Logs for complete treatment.     Pt enters department WBAT right LE using a single point cane mild antalgic gait.      Assessment & Plan     Assessment    Assessment details: Pt is responding to treatment with improving right knee AROM after passive stretching in flex and extension.  Pt with moderate pain today, but continues to tolerate treatment.  Pt was able to add sidelying hip add and increased step height with forward and lateral step ups.  Pt needed VC to extend right knee during step ups to activate quad.  Ended treatment with cold pack to decrease pain and soreness post exercise.  Will continue to see pt 3x/week for stretching, strengthening, gait, and modalities PRN for pain.        Progress per Plan of Care      Timed:         Manual Therapy:    10     mins  14368;     Therapeutic Exercise:    30     mins  81022;     Neuromuscular Hari:        mins  07703;    Therapeutic Activity:     10     mins  20143;     Gait Training:           mins  01729;     Ultrasound:          mins  63467;    Ionto                                   mins   61367  Self Care                            mins   11063  Canalith Repos         mins 58850      Un-Timed:  Electrical  Stimulation:         mins  10654 ( );  Dry Needling          mins self-pay  Traction          mins 00228      Timed Treatment:  50    mins   Total Treatment:     66   mins    Kary Pichardo, PT  [place] License: [id number]

## 2023-03-28 ENCOUNTER — OFFICE VISIT (OUTPATIENT)
Dept: ORTHOPEDIC SURGERY | Facility: CLINIC | Age: 58
End: 2023-03-28
Payer: COMMERCIAL

## 2023-03-28 VITALS — BODY MASS INDEX: 36.62 KG/M2 | WEIGHT: 199 LBS | HEIGHT: 62 IN

## 2023-03-28 DIAGNOSIS — Z96.651 STATUS POST TOTAL RIGHT KNEE REPLACEMENT: Primary | ICD-10-CM

## 2023-03-28 DIAGNOSIS — M17.12 PRIMARY OSTEOARTHRITIS OF LEFT KNEE: ICD-10-CM

## 2023-03-28 PROCEDURE — 73560 X-RAY EXAM OF KNEE 1 OR 2: CPT | Performed by: INTERNAL MEDICINE

## 2023-03-28 PROCEDURE — 99024 POSTOP FOLLOW-UP VISIT: CPT | Performed by: INTERNAL MEDICINE

## 2023-03-28 NOTE — PROGRESS NOTES
Subjective:     Patient ID: Sharonda Preciado is a 57 y.o. female.    Chief Complaint:    History of Present Illness  Sharonda Preciado returns to clinic today for evaluation of right knee status post total knee arthroplasty 2 weeks ago.  The patient think she is doing incredibly well and is no longer using the walker or taking any pain medication.  She is ambulatory with a cane today and states that her knee feels significantly better than prior to surgery and now her left knee is bothering her.  She has been working in physical therapy and they state that her range of motion was from 4 210.  She is very pleased with the result so far and is anxious to continue in physical therapy and make continued improvements     Social History     Occupational History   • Not on file   Tobacco Use   • Smoking status: Never   • Smokeless tobacco: Never   Vaping Use   • Vaping Use: Never used   Substance and Sexual Activity   • Alcohol use: No   • Drug use: Never   • Sexual activity: Not Currently     Partners: Male     Birth control/protection: None      Past Medical History:   Diagnosis Date   • Anemia    • Anticardiolipin antibody positive    • Antiphospholipid antibody positive    • Hypertension    • Hypothyroid    • Knee swelling    • Sleep apnea with use of continuous positive airway pressure (CPAP)      Past Surgical History:   Procedure Laterality Date   •  SECTION     • COLONOSCOPY     • DILATATION AND CURETTAGE     • TOTAL KNEE ARTHROPLASTY Right 3/15/2023    Procedure: TOTAL KNEE ARTHROPLASTY WITH CORI ROBOT;  Surgeon: Jeremy Meneses MD;  Location: [facility];  Service: Robotics - Ortho;  Laterality: Right;  RIGHT TOTAL KNEE ARTHROPLASTY WITH CORI ROBOT       Family History   Problem Relation Age of Onset   • Heart disease Mother    • Diabetes Mother    • Heart disease Father    • Rheumatologic disease Maternal Uncle         Sjorgrens   • Rheumatologic disease Maternal Grandmother         RA   • Malig Hyperthermia  "Neg Hx                  Objective:  Vitals:    03/28/23 1134   Weight: 90.3 kg (199 lb)   Height: 157.5 cm (62\")         03/28/23  1134   Weight: 90.3 kg (199 lb)     Body mass index is 36.4 kg/m².        Right Knee Exam     Tenderness   Right knee tenderness location: mild global.    Range of Motion   Extension: 5   Flexion: 110     Tests   Varus: negative Valgus: negative  Drawer:  Anterior - negative    Posterior - negative    Other   Erythema: absent  Scars: present  Sensation: normal  Pulse: present  Swelling: mild  Effusion: effusion (mild) present    Comments:  Incision healing with no signs of infection      Left Knee Exam     Tenderness   The patient is experiencing tenderness in the medial joint line.    Range of Motion   Extension: 0   Flexion: 120     Tests   Darrell:  Medial - positive Lateral - negative  Varus: negative Valgus: negative  Drawer:  Anterior - negative     Posterior - negative    Other   Erythema: absent  Scars: absent  Sensation: normal  Pulse: present  Swelling: none  Effusion: no effusion present               Imaging: AP and lateral of the right knee were ordered and reviewed by myself in the office today  Indication: Right knee surgery  Findings: X-rays demonstrate a right total knee arthroplasty with implants in expected position.  No signs of migration subsidence or loosening.  Comparative studies: Immediate postoperative films    Assessment:        1. Status post total right knee replacement           Plan:          1. Discussed treatment options at length with patient at today's visit.  I discussed with her that she is doing excellent in terms of her physical therapy.  Her range of motion is about 5 degrees to 110 degrees and she is still less than 2 weeks out.  I encouraged her to continue to push hard in physical therapy to achieve full extension.  I encouraged her to stay active and walk is much as possible.  I also encouraged her to try to wean off of the cane.  She states " she is not using it very much anyways and usually just holds it and carries it as she walks around.  The patient denies similar symptoms the office would like her to have full range of motion prior to her contralateral side.  We will also discuss at the next visit preoperative consultation to perform a left total knee hopefully before June 30 when her insurance changes.  2. Follow up: 4 weeks with 4 standing views of the left knee for presurgical consultation.      Sharonda Preciado was in agreement with plan and had all questions answered.     Medications:  No orders of the defined types were placed in this encounter.      Followup:  No follow-ups on file.    Diagnoses and all orders for this visit:    1. Status post total right knee replacement (Primary)  -     XR Knee 1 or 2 View Right          Dictated utilizing Dragon dictation

## 2023-03-29 ENCOUNTER — TREATMENT (OUTPATIENT)
Dept: PHYSICAL THERAPY | Facility: CLINIC | Age: 58
End: 2023-03-29
Payer: COMMERCIAL

## 2023-03-29 DIAGNOSIS — R26.2 DIFFICULTY WALKING: Primary | ICD-10-CM

## 2023-03-29 DIAGNOSIS — M25.561 CHRONIC PAIN OF RIGHT KNEE: ICD-10-CM

## 2023-03-29 DIAGNOSIS — G89.29 CHRONIC PAIN OF RIGHT KNEE: ICD-10-CM

## 2023-03-29 PROCEDURE — 97110 THERAPEUTIC EXERCISES: CPT | Performed by: PHYSICAL THERAPIST

## 2023-03-29 PROCEDURE — 97140 MANUAL THERAPY 1/> REGIONS: CPT | Performed by: PHYSICAL THERAPIST

## 2023-03-29 PROCEDURE — 97530 THERAPEUTIC ACTIVITIES: CPT | Performed by: PHYSICAL THERAPIST

## 2023-03-29 NOTE — PROGRESS NOTES
Physical Therapy Daily Treatment Note      Patient: Sharonda Preciado   : 1965  Referring practitioner: Jeremy Meneses MD  Date of Initial Visit: Type: THERAPY  Noted: 3/17/2023  Today's Date: 3/29/2023  Patient seen for 6 sessions       Visit Diagnoses:    ICD-10-CM ICD-9-CM   1. Chronic pain of right knee  M25.561 719.46    G89.29 338.29   2. Difficulty walking  R26.2 719.7       Subjective   Sharonda Preciado reports: my knee is feeling stiff but would like to try the bike today.      Objective   See Exercise, Manual, and Modality Logs for complete treatment.     Assessment/Plan   Pt is progressing well s/p R TKA and was able to progress to full revolutions on [employer] LE bike.  Attempted upright bike but was unable to make full revolutions. Added hang weight, retro step, single leg stance, Leg Press and rocker board activities to faciliate TKE and balance with good tolerance.     Progress per Plan of Care             Timed:         Manual Therapy:   8      mins  86674;     Therapeutic Exercise:   27      mins  63450;     Neuromuscular Hari:        mins  69080;    Therapeutic Activity:    10      mins  40177;     Gait Training:           mins  51594;     Ultrasound:          mins  08477;    Ionto                                   mins   19824  Self Care                            mins   49379    Timed Treatment:  45    mins   Total Treatment:     55   mins    FLORI Hernandez License: #[id number]

## 2023-03-31 ENCOUNTER — TREATMENT (OUTPATIENT)
Dept: PHYSICAL THERAPY | Facility: CLINIC | Age: 58
End: 2023-03-31
Payer: COMMERCIAL

## 2023-03-31 DIAGNOSIS — G89.29 CHRONIC PAIN OF RIGHT KNEE: Primary | ICD-10-CM

## 2023-03-31 DIAGNOSIS — M25.561 CHRONIC PAIN OF RIGHT KNEE: Primary | ICD-10-CM

## 2023-03-31 DIAGNOSIS — R26.2 DIFFICULTY WALKING: ICD-10-CM

## 2023-03-31 PROCEDURE — 97140 MANUAL THERAPY 1/> REGIONS: CPT | Performed by: PHYSICAL THERAPIST

## 2023-03-31 PROCEDURE — 97530 THERAPEUTIC ACTIVITIES: CPT | Performed by: PHYSICAL THERAPIST

## 2023-03-31 PROCEDURE — 97110 THERAPEUTIC EXERCISES: CPT | Performed by: PHYSICAL THERAPIST

## 2023-03-31 NOTE — PROGRESS NOTES
Physical Therapy Daily Treatment Note    [facility]  [address] [place] [address]  [phone] (phone)  [phone] (fax)    Patient: Sharonda Preciado   : 1965  Diagnosis/ICD-10 Code:  Chronic pain of right knee [M25.561, G89.29]  Referring practitioner: Jeremy Meneses MD  Date of Initial Visit: Type: THERAPY  Noted: 3/17/2023  Today's Date: 3/31/2023  Patient seen for 7 sessions           Subjective   Patient reports her knee is feeling pretty good overall.    Objective     See Exercise, Manual, and Modality Logs for complete treatment.     Assessment/Plan  Patient able to achieve full revolutions on trubike today. Adjusted positioning during sidelying hip abduction kicks to avoid compensation from hip flexors. R knee appeared a little tighter during heel slides today however motion improved when she transitioned to a seated position.          Timed:    Manual Therapy:   8      mins  17535;  Therapeutic Exercise:    14     mins  67236;     Neuromuscular Hari:        mins  33960;    Therapeutic Activity:     8     mins  84307;     Gait Training:           mins  65328;     Ultrasound:          mins  07627;    Electrical Stimulation:         mins  86259 ( );  Iontophoresis         mins 90449;  Aquatic Therapy         mins 90067;    Untimed:  Electrical Stimulation:         mins  18795 ( );  Traction:         mins  75316;   Dry Needling   (1-2 muscles)       mins  (Self-pay)  Dry Needling (3-4 muscles)        mins  (Self-pay)  Dry Needling Trial          mins DRYNDLTRIAL  (No Charge)    Timed Treatment:   30   mins   Total Treatment:     50   mins    Odette Arrieta PT  Physical Therapist    [place] License:[id number]

## 2023-04-03 ENCOUNTER — TREATMENT (OUTPATIENT)
Dept: PHYSICAL THERAPY | Facility: CLINIC | Age: 58
End: 2023-04-03
Payer: COMMERCIAL

## 2023-04-03 DIAGNOSIS — M25.561 CHRONIC PAIN OF RIGHT KNEE: ICD-10-CM

## 2023-04-03 DIAGNOSIS — G89.29 CHRONIC PAIN OF RIGHT KNEE: ICD-10-CM

## 2023-04-03 DIAGNOSIS — R26.2 DIFFICULTY WALKING: Primary | ICD-10-CM

## 2023-04-03 PROCEDURE — 97530 THERAPEUTIC ACTIVITIES: CPT | Performed by: PHYSICAL THERAPIST

## 2023-04-03 PROCEDURE — 97110 THERAPEUTIC EXERCISES: CPT | Performed by: PHYSICAL THERAPIST

## 2023-04-03 PROCEDURE — 97140 MANUAL THERAPY 1/> REGIONS: CPT | Performed by: PHYSICAL THERAPIST

## 2023-04-03 NOTE — PROGRESS NOTES
Physical Therapy Daily Treatment Note      Patient: Sharonda Preciado   : 1965  Referring practitioner: Jeremy Meneses MD  Date of Initial Visit: Type: THERAPY  Noted: 3/17/2023  Today's Date: 4/3/2023  Patient seen for 8 sessions         Sharonda Preciado reports: 3/10 pain in R knee; new medial joint line pain.         Objective     R knee flexion: 95 degrees  R knee flexion with strap: 106 degrees, up to 108 degrees after stretches    R knee ext: lacking 2 weeks from neutral     See Exercise, Manual, and Modality Logs for complete treatment.       Assessment/Plan  Pt is returning to work next week 4/10 at work; able to ice, elevate and walk around her home throughout her work day. Able to tolerate increased reps with HS curls and TKE against resistance along with step ups today. Most difficulty with retro step ups with increased reliance on B handrails and decreased step height to improve quad control/strength. Pt with overall fatigue and noted increased muscle fatigue with exercises vs last week; pt advised just to do quad sets and heel slides tonight for homework and rest until tomorrow.        Progress per Plan of Care and Progress strengthening /stabilization /functional activity           Timed:  Manual Therapy:    8     mins  53093;  Therapeutic Exercise:    32     mins  08853;     Neuromuscular Hari:    -    mins  13960;    Therapeutic Activity:     16     mins  68545;     Gait Training:      -     mins  05845;     Ultrasound:     -     mins  47285;      Untimed:  Electrical Stimulation:    -     mins  30657 ( );  Mechanical Traction:    -     mins  07957;   Dry needling:      -     mins  06688/    Timed Treatment:   56   mins   Total Treatment:     70   mins  Hilda Morrow PT  Physical Therapist    License #: [id number]

## 2023-04-05 ENCOUNTER — TREATMENT (OUTPATIENT)
Dept: PHYSICAL THERAPY | Facility: CLINIC | Age: 58
End: 2023-04-05
Payer: COMMERCIAL

## 2023-04-05 DIAGNOSIS — M25.561 CHRONIC PAIN OF RIGHT KNEE: ICD-10-CM

## 2023-04-05 DIAGNOSIS — R26.2 DIFFICULTY WALKING: Primary | ICD-10-CM

## 2023-04-05 DIAGNOSIS — G89.29 CHRONIC PAIN OF RIGHT KNEE: ICD-10-CM

## 2023-04-05 PROCEDURE — 97530 THERAPEUTIC ACTIVITIES: CPT | Performed by: PHYSICAL THERAPIST

## 2023-04-05 PROCEDURE — 97110 THERAPEUTIC EXERCISES: CPT | Performed by: PHYSICAL THERAPIST

## 2023-04-05 PROCEDURE — 97140 MANUAL THERAPY 1/> REGIONS: CPT | Performed by: PHYSICAL THERAPIST

## 2023-04-05 NOTE — PROGRESS NOTES
Physical Therapy Daily Treatment Note    [facility]  [address] [place] [address]  [phone] (phone)  [phone] (fax)    Patient: Sharonda Preciado   : 1965  Diagnosis/ICD-10 Code:  Difficulty walking [R26.2]  Referring practitioner: Jeremy Meneses MD  Date of Initial Visit: Type: THERAPY  Noted: 3/17/2023  Today's Date: 2023  Patient seen for 9 sessions           Subjective   Pain is a little better overall. Sleep is still a challenge, up all night, just can't seem to get comfortable    Objective     See Exercise, Manual, and Modality Logs for complete treatment.     R knee PROM [id number]=425 degrees    Assessment/Plan  Had Sharonda sit on a lower seat on Trubike to try and emphasize knee flexion ROM. Discussed some additional knee flexion stretches she can add at home to prevent scar tissue build up such as chair scoots and knee lunge stretch. She can now confidently complete a floor transfer to perform her PT exercises at home. Placed emphasis on heel touch during step downs to work eccentric quad strength.         Timed:    Manual Therapy:    8     mins  34813;  Therapeutic Exercise:    24     mins  46555;     Neuromuscular Hari:        mins  17678;    Therapeutic Activity:     24     mins  06586;     Gait Training:           mins  21998;     Ultrasound:          mins  74619;    Electrical Stimulation:         mins  70404 ( );  Iontophoresis         mins 36359;  Aquatic Therapy         mins 51599;    Untimed:  Electrical Stimulation:         mins  06301 ( );  Traction:         mins  57533;   Dry Needling   (1-2 muscles)       mins  (Self-pay)  Dry Needling (3-4 muscles)        mins  (Self-pay)  Dry Needling Trial          mins DRYNDLTRIAL  (No Charge)    Timed Treatment:   56   mins   Total Treatment:     65   mins    Odette Arrieta, PT  Physical Therapist    [place] License:[id number]

## 2023-04-07 ENCOUNTER — TREATMENT (OUTPATIENT)
Dept: PHYSICAL THERAPY | Facility: CLINIC | Age: 58
End: 2023-04-07
Payer: COMMERCIAL

## 2023-04-07 DIAGNOSIS — R26.2 DIFFICULTY WALKING: Primary | ICD-10-CM

## 2023-04-07 DIAGNOSIS — M25.561 CHRONIC PAIN OF RIGHT KNEE: ICD-10-CM

## 2023-04-07 DIAGNOSIS — G89.29 CHRONIC PAIN OF RIGHT KNEE: ICD-10-CM

## 2023-04-07 PROCEDURE — 97110 THERAPEUTIC EXERCISES: CPT | Performed by: PHYSICAL THERAPIST

## 2023-04-07 PROCEDURE — 97140 MANUAL THERAPY 1/> REGIONS: CPT | Performed by: PHYSICAL THERAPIST

## 2023-04-07 PROCEDURE — 97530 THERAPEUTIC ACTIVITIES: CPT | Performed by: PHYSICAL THERAPIST

## 2023-04-07 NOTE — PROGRESS NOTES
Physical Therapy Daily Treatment Note    [facility]  [address] [place] [address]  [phone] (phone)  [phone] (fax)    Patient: Sharonda Preciado   : 1965  Diagnosis/ICD-10 Code:  Difficulty walking [R26.2]  Referring practitioner: Jeremy Meneses MD  Date of Initial Visit: Type: THERAPY  Noted: 3/17/2023  Today's Date: 2023  Patient seen for 10 sessions           Subjective   Knee is a little tighter and swollen today.     Objective     See Exercise, Manual, and Modality Logs for complete treatment.     Assessment/Plan  Sharonda had a little trouble getting her knee around the bike initially due to tightness. R knee flexion ROM improved with manual stretching but she still measuring at grossly 105-110 degrees. She is having better quad stability with step ups but is still working on better eccentric muscle control when descending.          Timed:    Manual Therapy:    8     mins  04965;  Therapeutic Exercise:    12     mins  32383;     Neuromuscular Hari:        mins  75705;    Therapeutic Activity:     10     mins  22788;     Gait Training:           mins  48970;     Ultrasound:          mins  98770;    Electrical Stimulation:         mins  56103 ( );  Iontophoresis         mins 76375;  Aquatic Therapy         mins 42116;    Untimed:  Electrical Stimulation:         mins  45650 ( );  Traction:         mins  04038;   Dry Needling   (1-2 muscles)       mins 34079 (Self-pay)  Dry Needling (3-4 muscles)        mins  (Self-pay)  Dry Needling Trial          mins DRYNDLTRIAL  (No Charge)    Timed Treatment:   30   mins   Total Treatment:     50   mins    Odette Arrieta PT  Physical Therapist    [place] License:[id number]

## 2023-04-10 ENCOUNTER — TREATMENT (OUTPATIENT)
Dept: PHYSICAL THERAPY | Facility: CLINIC | Age: 58
End: 2023-04-10
Payer: COMMERCIAL

## 2023-04-10 DIAGNOSIS — R26.2 DIFFICULTY WALKING: Primary | ICD-10-CM

## 2023-04-10 DIAGNOSIS — G89.29 CHRONIC PAIN OF RIGHT KNEE: ICD-10-CM

## 2023-04-10 DIAGNOSIS — M25.561 CHRONIC PAIN OF RIGHT KNEE: ICD-10-CM

## 2023-04-10 PROCEDURE — 97530 THERAPEUTIC ACTIVITIES: CPT | Performed by: PHYSICAL THERAPIST

## 2023-04-10 PROCEDURE — 97110 THERAPEUTIC EXERCISES: CPT | Performed by: PHYSICAL THERAPIST

## 2023-04-10 PROCEDURE — 97140 MANUAL THERAPY 1/> REGIONS: CPT | Performed by: PHYSICAL THERAPIST

## 2023-04-10 NOTE — PROGRESS NOTES
Physical Therapy Daily Treatment Note    Patient: Sharonda Preciado   : 1965  Referring practitioner: Jeremy Meneses MD  Date of Initial Visit: Type: THERAPY  Noted: 3/17/2023  Today's Date: 4/10/2023  Patient seen for 11 sessions       Visit Diagnoses:    ICD-10-CM ICD-9-CM   1. Difficulty walking  R26.2 719.7   2. Chronic pain of right knee  M25.561 719.46    G89.29 338.29       Sharonda Preciado reports: she started back to work today, but she works from home.  Pt states it has been okay.  Pt did have some increased right medial knee pain over the weekend.  Pt did not do anything this weekend and used ice and rested.  It is much better today.      Subjective       Objective          Active Range of Motion     Right Knee   Flexion: 114 degrees       See Exercise, Manual, and Modality Logs for complete treatment.     Pt did not do ice, but states she will ice at home due to having to get back to work.      Assessment & Plan     Assessment    Assessment details: Pt is responding to treatment with improving right knee AROM after passive stretching.  Pt with some increased knee pain over the weekend, but with rest and using ice the pain has decreased.  Pt was able to complete full revolutions on bike in both directions just after a few seconds of being on the bike.  Increased several strengthening exercises today without difficulty.   Will continue to see pt 3x/week for stretching, strengthening, gait, and modalities PRN for pain.        Progress per Plan of Care      Timed:         Manual Therapy:    8     mins  98530;     Therapeutic Exercise:    22     mins  01387;     Neuromuscular Hari:        mins  61634;    Therapeutic Activity:    15      mins  83325;     Gait Training:           mins  03872;     Ultrasound:          mins  38983;    Ionto                                   mins   94319  Self Care                            mins   00649  Canalith Repos         mins 55939      Un-Timed:  Electrical Stimulation:          mins  69089 ( );  Dry Needling          mins self-pay  Traction          mins 97868      Timed Treatment:  45    mins   Total Treatment:    50    mins    Kary Pichardo, PT  [place] License: [id number]

## 2023-04-12 ENCOUNTER — TREATMENT (OUTPATIENT)
Dept: PHYSICAL THERAPY | Facility: CLINIC | Age: 58
End: 2023-04-12
Payer: COMMERCIAL

## 2023-04-12 DIAGNOSIS — G89.29 CHRONIC PAIN OF RIGHT KNEE: ICD-10-CM

## 2023-04-12 DIAGNOSIS — M25.561 CHRONIC PAIN OF RIGHT KNEE: ICD-10-CM

## 2023-04-12 DIAGNOSIS — R26.2 DIFFICULTY WALKING: Primary | ICD-10-CM

## 2023-04-12 PROCEDURE — 97530 THERAPEUTIC ACTIVITIES: CPT | Performed by: PHYSICAL THERAPIST

## 2023-04-12 PROCEDURE — 97110 THERAPEUTIC EXERCISES: CPT | Performed by: PHYSICAL THERAPIST

## 2023-04-12 NOTE — PROGRESS NOTES
Physical Therapy Daily Treatment Note      Patient: Sharonda Preciado   : 1965  Referring practitioner: Jeremy Meneses MD  Date of Initial Visit: Type: THERAPY  Noted: 3/17/2023  Today's Date: 2023  Patient seen for 12 sessions       Visit Diagnoses:    ICD-10-CM ICD-9-CM   1. Difficulty walking  R26.2 719.7   2. Chronic pain of right knee  M25.561 719.46    G89.29 338.29       Subjective   Sharonda Preciado reports: my knee is doing well, not much pain.  Biggest issue returning to work is exhaustion.  I toss and turn at night and have difficulty falling asleep.     Objective   See Exercise, Manual, and Modality Logs for complete treatment.   AAROM R knee flex 120 deg    Assessment/Plan   Pt's ROM is progressing well and demonstrated good quad control with step up/over activity.  We discussed importance of good sleep to allow for healing process.    Progress per Plan of Care  Pt will ice at home           Timed:         Manual Therapy:   5      mins  76829;     Therapeutic Exercise:  33       mins  75053;     Neuromuscular Hari:        mins  78640;    Therapeutic Activity:    20      mins  38281;     Gait Training:           mins  16275;     Ultrasound:          mins  33180;    Ionto                                   mins   03054  Self Care                            mins   30087      Timed Treatment:   58   mins   Total Treatment:    58    mins    FLORI Hernandez License: #[id number]

## 2023-04-14 ENCOUNTER — TREATMENT (OUTPATIENT)
Dept: PHYSICAL THERAPY | Facility: CLINIC | Age: 58
End: 2023-04-14
Payer: COMMERCIAL

## 2023-04-14 DIAGNOSIS — G89.29 CHRONIC PAIN OF RIGHT KNEE: ICD-10-CM

## 2023-04-14 DIAGNOSIS — M25.561 CHRONIC PAIN OF RIGHT KNEE: ICD-10-CM

## 2023-04-14 DIAGNOSIS — R26.2 DIFFICULTY WALKING: Primary | ICD-10-CM

## 2023-04-14 NOTE — PROGRESS NOTES
Physical Therapy Daily Treatment Note    Patient: Sharonda Preciado   : 1965  Referring practitioner: Jeremy Meneses MD  Date of Initial Visit: Type: THERAPY  Noted: 3/17/2023  Today's Date: 2023  Patient seen for 13 sessions       Visit Diagnoses:    ICD-10-CM ICD-9-CM   1. Difficulty walking  R26.2 719.7   2. Chronic pain of right knee  M25.561 719.46    G89.29 338.29       Sharonda Preciado reports: she is feeling good today.  States she has been tired this week from starting back to work.      Subjective       Objective          Active Range of Motion     Right Knee   Flexion: 120 degrees       See Exercise, Manual, and Modality Logs for complete treatment.       Assessment & Plan     Assessment    Assessment details: Pt continues to progress well with therapy.  Pt was able to reach 120 degrees of right knee flex after passive stretching.  Added weight to leg lifts and single leg press today to increase right hip and knee strength.  Pt needed to return to work, so will ice once pt gets home.  Will continue to see pt 2x/week for stretching and exercise progression.  Pt will need a re-evaluation next visit.        Progress per Plan of Care      Timed:         Manual Therapy:   8     mins  83403;     Therapeutic Exercise:    26     mins  79566;     Neuromuscular Hari:        mins  54357;    Therapeutic Activity:    15      mins  88869;     Gait Training:           mins  08276;     Ultrasound:          mins  84294;    Ionto                                   mins   72045  Self Care                            mins   17506  Canalith Repos         mins 96881      Un-Timed:  Electrical Stimulation:         mins  03087 ( );  Dry Needling          mins self-pay  Traction          mins 71308      Timed Treatment:  49    mins   Total Treatment:     54   mins    Kary Pichardo, PT  [place] License: [id number]

## 2023-04-18 ENCOUNTER — TELEPHONE (OUTPATIENT)
Dept: PHYSICAL THERAPY | Facility: CLINIC | Age: 58
End: 2023-04-18

## 2023-04-19 ENCOUNTER — TELEPHONE (OUTPATIENT)
Dept: ORTHOPEDIC SURGERY | Facility: CLINIC | Age: 58
End: 2023-04-19
Payer: COMMERCIAL

## 2023-04-19 NOTE — TELEPHONE ENCOUNTER
Caller: Sharonda Preciado    Relationship to patient: Self    Best call back number: [phone]    Patient is needing:RETURNING CALL TO [place] TO LET HER KNOW SHE STARTED WORK 4.10.23 WITH NO RESTRICTIONS

## 2023-04-21 ENCOUNTER — TREATMENT (OUTPATIENT)
Dept: PHYSICAL THERAPY | Facility: CLINIC | Age: 58
End: 2023-04-21
Payer: COMMERCIAL

## 2023-04-21 DIAGNOSIS — G89.29 CHRONIC PAIN OF RIGHT KNEE: ICD-10-CM

## 2023-04-21 DIAGNOSIS — R26.2 DIFFICULTY WALKING: Primary | ICD-10-CM

## 2023-04-21 DIAGNOSIS — M25.561 CHRONIC PAIN OF RIGHT KNEE: ICD-10-CM

## 2023-04-21 PROCEDURE — 97112 NEUROMUSCULAR REEDUCATION: CPT | Performed by: PHYSICAL THERAPIST

## 2023-04-21 PROCEDURE — 97110 THERAPEUTIC EXERCISES: CPT | Performed by: PHYSICAL THERAPIST

## 2023-04-21 PROCEDURE — 97530 THERAPEUTIC ACTIVITIES: CPT | Performed by: PHYSICAL THERAPIST

## 2023-04-21 NOTE — PROGRESS NOTES
30-Day / 10-Visit Progress Note     [facility]  [address] [place], [place] [id number] [phone] (phone)  [phone] (fax)    Patient: Sharonda Preciado   : 1965  Diagnosis/ICD-10 Code:  Difficulty walking [R26.2]  Referring practitioner: Jeremy Meneses MD  Date of Initial Visit: Type: THERAPY  Noted: 3/17/2023  Today's Date: 2023  Patient seen for 14 sessions      Subjective:     Clinical Progress: improved  Home Program Compliance: Yes      Subjective   Sharonda reports she did a bunch of stairs this weekend. Going down felt scary and she descended with a step to pattern.    Objective         Active Range of Motion   Left Knee   Flexion: 123 degrees   Extension: 0 degrees      Right Knee   Flexion: 120 degrees   Extension: 0 degrees     Strength    Right Knee  Flexion 4+  Extension 4     Right Knee Flexibility Comments:   Hamstring and quad tightness     Swelling      Left Knee Girth Measurement (cm)   Joint line: 45 cm     Right Knee Girth Measurement (cm)   Joint line: 45 cm     Ambulation      Comments   No AD, even step length, no significant antalgia        See Exercise, Manual, and Modality Logs for complete treatment.         Functional Outcome Score: LEFS=62/80     Assessment/Plan  Sharonda Preciado has been seen for 14 physical therapy sessions for  Right knee pain s/p TKA surgery on 3/15/23.  Treatment has included therapeutic exercise, manual therapy, therapeutic activity and patient education with home exercise program . Progress to physical therapy goals is good. Sharonda is now walking without an AD and presents with full R knee flexion and extension AROM. Strength isimproving as well. Trying to increase comfort negotiating stairs reciprocally, especially when descending. She will benefit from continued skilled physical therapy to address remaining impairments and functional limitations.      Goals  Plan Goals: STGs to be completed within 30 days:  -Patient will  demonstrate compliance and independence with initial HEP (MET)  -Patient will increase R Knee AROM to 5-110 degrees to help normalize gait mechanics and increase ease with transfers (MET)  -Patient will perform sit to stand transfer with equilateral WB and no UE assistance (MET)  -Patient will ambulate household distances with SC in order to reduce reliance on UE support with gait (MET)    LTGs to be completed within 90 days:  -Patient will increase R Knee AROM to 0-120 degrees to help normalize gait mechanics and increase ease with transfers (MET)  -Patient will complete community mobility without AD and with even step length and heel-toe gait mechanics (MET)  -Patient will reduce edema in R knee by 2 cm to help reduce pain/discomfort (MET)  -Patient will improve score on LEFS from 2 at eval to 40 or greater to improve quality of life (MET)  -Sharonda will descend stairs reciprocally with good confidence (NEW)      Recommendations: Continue as planned  Timeframe: 1 month; 2x/week  Prognosis to achieve goals: good    PT Signature: Odette Arrieta PT    [place] License Number: [id number]    Electronically signed by Odette Arrieta PT, 04/21/23, 9:24 AM EDT      Based upon review of the patient's progress and continued therapy plan, it is my medical opinion that Sharonda Preciado should continue physical therapy treatment at [facility]  [address] [place] [address] [phone].    Signature: __________________________________  Jeremy Meneses MD    Timed:  Manual Therapy:         mins  87092;  Therapeutic Exercise:    12     mins  79136;     Neuromuscular Hari:    8    mins  53117;    Therapeutic Activity:     10     mins  00475;     Gait Training:           mins  67422;     Ultrasound:          mins  45288;    Iontophoresis         mins 42864;    Untimed:  Electrical Stimulation:         mins  11359 ( );  Traction:         mins  03570;   Dry Needling   (1-2 muscles)        mins 20560 (Self-pay)  Dry Needling (3-4 muscles)        mins 20561 (Self-pay)  Dry Needling Trial          mins DRYNDLTRIAL  (No Charge)    Timed Treatment:   30   mins   Total Treatment:     50   mins

## 2023-04-24 ENCOUNTER — OFFICE VISIT (OUTPATIENT)
Dept: ORTHOPEDIC SURGERY | Facility: CLINIC | Age: 58
End: 2023-04-24
Payer: COMMERCIAL

## 2023-04-24 VITALS
WEIGHT: 195.8 LBS | HEART RATE: 91 BPM | DIASTOLIC BLOOD PRESSURE: 92 MMHG | SYSTOLIC BLOOD PRESSURE: 139 MMHG | BODY MASS INDEX: 36.03 KG/M2 | HEIGHT: 62 IN

## 2023-04-24 DIAGNOSIS — Z96.651 STATUS POST TOTAL RIGHT KNEE REPLACEMENT: ICD-10-CM

## 2023-04-24 DIAGNOSIS — M17.12 PRIMARY OSTEOARTHRITIS OF LEFT KNEE: Primary | ICD-10-CM

## 2023-04-24 RX ORDER — PREGABALIN 150 MG/1
150 CAPSULE ORAL ONCE
OUTPATIENT
Start: 2023-04-24 | End: 2023-04-24

## 2023-04-24 RX ORDER — CHLORHEXIDINE GLUCONATE 500 MG/1
CLOTH TOPICAL ONCE
OUTPATIENT
Start: 2023-04-24

## 2023-04-24 RX ORDER — MELOXICAM 7.5 MG/1
15 TABLET ORAL ONCE
OUTPATIENT
Start: 2023-04-24 | End: 2023-04-24

## 2023-04-24 NOTE — PROGRESS NOTES
Subjective:     Patient ID: Sharonda Preciado is a 57 y.o. female.    Chief Complaint:    History of Present Illness  Sharonda Preciado presents to clinic today for evaluation of right knee status post total knee arthroplasty roughly 6 weeks ago.  The patient is here for preoperative consultation for her left knee.  She states that she has been progressing well in physical therapy with the right side and her left knee has become increasingly painful.  She states that her knee range of motion and PT is 0-123 and she has no pain at all.  She is set to have her insurance change in  and would like to have her left knee done before her insurance changes.  She states that her left knee has become increasingly painful and bothersome and is limiting her activities of daily living.  She has tried multiple injections physical therapy activity modification and significant weight loss.  Nothing has alleviated her symptoms to her satisfaction.     Social History     Occupational History   • Not on file   Tobacco Use   • Smoking status: Never   • Smokeless tobacco: Never   Vaping Use   • Vaping Use: Never used   Substance and Sexual Activity   • Alcohol use: No   • Drug use: Never   • Sexual activity: Not Currently     Partners: Male     Birth control/protection: None      Past Medical History:   Diagnosis Date   • Anemia    • Anticardiolipin antibody positive    • Antiphospholipid antibody positive    • Hypertension    • Hypothyroid    • Knee swelling    • Sleep apnea with use of continuous positive airway pressure (CPAP)      Past Surgical History:   Procedure Laterality Date   •  SECTION     • COLONOSCOPY     • DILATATION AND CURETTAGE     • TOTAL KNEE ARTHROPLASTY Right 3/15/2023    Procedure: TOTAL KNEE ARTHROPLASTY WITH CORI ROBOT;  Surgeon: Jeremy Meneses MD;  Location: [facility];  Service: Robotics - Ortho;  Laterality: Right;  RIGHT TOTAL KNEE ARTHROPLASTY WITH CORI ROBOT       Family History   Problem Relation  "Age of Onset   • Heart disease Mother    • Diabetes Mother    • Heart disease Father    • Rheumatologic disease Maternal Uncle         Sjorgrens   • Rheumatologic disease Maternal Grandmother         RA   • Malig Hyperthermia Neg Hx                  Objective:  Vitals:    23 0842   BP: 139/92   Pulse: 91   Weight: 88.8 kg (195 lb 12.8 oz)   Height: 157.5 cm (62\")         23  0842   Weight: 88.8 kg (195 lb 12.8 oz)     Body mass index is 35.81 kg/m².        Right Knee Exam     Tenderness   The patient is experiencing no tenderness.     Range of Motion   Extension: 0   Flexion: 120     Tests   Varus: negative Valgus: negative  Drawer:  Anterior - negative    Posterior - negative    Other   Erythema: absent  Scars: present  Sensation: normal  Pulse: present  Swelling: none  Effusion: no effusion present      Left Knee Exam     Tenderness   The patient is experiencing tenderness in the medial retinaculum, medial joint line and medial hamstring.    Range of Motion   Extension: 5   Flexion: 120     Tests   Darrell:  Medial - positive   Varus: negative Valgus: negative  Drawer:  Anterior - negative     Posterior - negative    Other   Erythema: absent  Scars: absent  Sensation: normal  Pulse: present  Swelling: mild  Effusion: no effusion present               Imagin standing views of the left knee were ordered and reviewed by myself in the office today  Indication: Left knee pain  Findings: X-rays demonstrate severe tricompartmental osteoarthritis of the left knee most pronounced in the medial compartment with bone-on-bone articulation subchondral sclerosis and cystic changes as well as marginal osteophytes.  There is no acute fracture noted.  Comparative studies: Plain radiographs prior to her right total knee    Assessment:        1. Primary osteoarthritis of left knee    2. Status post total right knee replacement           Plan:          She has failed conservative management including injections " corticosteroid, physical therapy, activity modification, and significant weight loss.  She recently underwent a right total knee and had an incredible recovery from it and is thrilled with the outcome.  I discussed further non operative management of knee pain and arthritis including but not limited to intra-articular corticosteroid injections, physical therapy, custom brace wear, activity modification, use of an assistive device, and low impact exercises.  Patient voiced understanding of nonoperative treatment options available but would like to proceed with left knee replacement surgery.    I reviewed anatomy and a model of a total knee arthroplasty, as well as typical postoperative recovery of 6-12 months before maximal recovery, and possible need for rehabilitation stay after hospitalization.  The goals, indications, risks, and complications of the procedure were discussed with the patient. Specifically, I discussed the expectations for lateral knee numbness or parasthesias, difficulty with kneeling, noise generation by the knee replacement, and occasional pain. I also explained that in some series of patients in the research literature, up to 20% of patients are dissatisfied with their total knee arthroplasty. I also discussed the risks of the procedure, including stiffness, fracture, implant loosening, implant failure, venous thromboembolism, infection, nerve palsy, vascular injury, need for more procedures and the risks of anesthesia. I also explained that she would meet with Anesthesiology preoperatively to discuss anesthetic risk.  All questions were answered.     Plan for left total knee arthroplasty.    Implants: [employer] cemented Legion TKS with [employer]  Anticoagulation: Asprin  Antibiotics: Cefazolin and Vanc (recent hospitalization, obesity)  Admission Type: Same Day  TXA: Yes   Post op abx: yes        Sharonda Preciado was in agreement with plan and had all questions answered.      Medications:  No orders of the defined types were placed in this encounter.      Followup:  No follow-ups on file.    Diagnoses and all orders for this visit:    1. Primary osteoarthritis of left knee (Primary)  -     XR Knee 4+ View Left    2. Status post total right knee replacement          Dictated utilizing Dragon dictation

## 2023-04-26 PROBLEM — M17.12 PRIMARY OSTEOARTHRITIS OF LEFT KNEE: Status: ACTIVE | Noted: 2023-04-26

## 2023-04-28 ENCOUNTER — TREATMENT (OUTPATIENT)
Dept: PHYSICAL THERAPY | Facility: CLINIC | Age: 58
End: 2023-04-28
Payer: COMMERCIAL

## 2023-04-28 DIAGNOSIS — R26.2 DIFFICULTY WALKING: Primary | ICD-10-CM

## 2023-04-28 DIAGNOSIS — G89.29 CHRONIC PAIN OF RIGHT KNEE: ICD-10-CM

## 2023-04-28 DIAGNOSIS — M25.561 CHRONIC PAIN OF RIGHT KNEE: ICD-10-CM

## 2023-04-28 NOTE — PROGRESS NOTES
Physical Therapy Daily Treatment Note    [facility]  [address] [place], [place] [id number] [phone] (phone)  [phone] (fax)    Patient: Sharonda Preciado   : 1965  Diagnosis/ICD-10 Code:  Difficulty walking [R26.2]  Referring practitioner: Jeremy Meneses MD  Date of Initial Visit: Type: THERAPY  Noted: 3/17/2023  Today's Date: 2023  Patient seen for 15 sessions           Subjective   Sharonda reports her knee is still feeling good.She had TKA surgery scheduled for May 24th with MARIA L BRANHAM.    Objective     See Exercise, Manual, and Modality Logs for complete treatment.     Assessment/Plan  Cued patient to try and perform leg press through more complete knee ROM and to focus on slower movement for better muscle control. She is getting some pain/tightness down her lateral R leg which could be coming from her TFL/ITB. Educated her on how to stretch the ITB and did some gentle STM with tiger tail.        Timed:    Manual Therapy:    8     mins  74359;  Therapeutic Exercise:    24     mins  49101;     Neuromuscular Hari:        mins  92934;    Therapeutic Activity:     10     mins  40177;     Gait Training:           mins  47163;     Ultrasound:          mins  94507;    Electrical Stimulation:         mins  87323 ( );  Iontophoresis         mins 64327;  Aquatic Therapy         mins 33464;    Untimed:  Electrical Stimulation:         mins  99987 ( );  Traction:         mins  60060;   Dry Needling   (1-2 muscles)       mins  (Self-pay)  Dry Needling (3-4 muscles)        mins  (Self-pay)  Dry Needling Trial          mins DRYNDLTRIAL  (No Charge)    Timed Treatment:   42   mins   Total Treatment:     42   mins    Odette Arrieta PT  Physical Therapist    [place] License:[id number]

## 2023-05-02 ENCOUNTER — TREATMENT (OUTPATIENT)
Dept: PHYSICAL THERAPY | Facility: CLINIC | Age: 58
End: 2023-05-02
Payer: COMMERCIAL

## 2023-05-02 DIAGNOSIS — M25.561 CHRONIC PAIN OF RIGHT KNEE: ICD-10-CM

## 2023-05-02 DIAGNOSIS — G89.29 CHRONIC PAIN OF RIGHT KNEE: ICD-10-CM

## 2023-05-02 DIAGNOSIS — R26.2 DIFFICULTY WALKING: Primary | ICD-10-CM

## 2023-05-02 NOTE — PROGRESS NOTES
Physical Therapy Daily Treatment Note    Patient: Sharonda Preciado   : 1965  Referring practitioner: Jeremy Meneses MD  Date of Initial Visit: Type: THERAPY  Noted: 3/17/2023  Today's Date: 2023  Patient seen for 16 sessions       Visit Diagnoses:    ICD-10-CM ICD-9-CM   1. Difficulty walking  R26.2 719.7   2. Chronic pain of right knee  M25.561 719.46    G89.29 338.29       Sharonda Preciado reports: her knee is feeling good, but her IT band is still hurting.  It is better at her hip, but the lower leg is still bothering her quite a bit.      Subjective       Objective          Active Range of Motion     Right Knee   Flexion: 113 degrees   Extension: 0 degrees     Additional Active Range of Motion Details  Supine knee AAROM flex 115 degrees       See Exercise, Manual, and Modality Logs for complete treatment.       Assessment & Plan     Assessment    Assessment details: Pt continues to progress well with therapy.  Pt with decreased right knee flex due to increased lateral knee pain from IT band syndrome.  Pt with isolated tightness over the distal attachment of the IT band.  Performed some CFM over the distal attachment to decreased tightness.  Pt tolerated exercises fairly, but still held steps due to IT band pain.  Pt is going to ice at home.  Will continue to see pt once per week with plans to discharge soon to a [facility].         Progress per Plan of Care      Timed:         Manual Therapy:    8     mins  36945;     Therapeutic Exercise:    23     mins  57583;     Neuromuscular Hari:        mins  53139;    Therapeutic Activity:    10      mins  83750;     Gait Training:           mins  67618;     Ultrasound:          mins  33377;    Ionto                                   mins   53729  Self Care                            mins   86366  Canalith Repos         mins 65235      Un-Timed:  Electrical Stimulation:         mins  29062 ( );  Dry Needling          mins self-pay  Traction          mins  23162      Timed Treatment:  41    mins   Total Treatment:    46    mins    Kary Pichardo, PT  [place] License: [id number]

## 2023-05-08 ENCOUNTER — TREATMENT (OUTPATIENT)
Dept: PHYSICAL THERAPY | Facility: CLINIC | Age: 58
End: 2023-05-08
Payer: COMMERCIAL

## 2023-05-08 DIAGNOSIS — R26.2 DIFFICULTY WALKING: Primary | ICD-10-CM

## 2023-05-08 DIAGNOSIS — M25.561 CHRONIC PAIN OF RIGHT KNEE: ICD-10-CM

## 2023-05-08 DIAGNOSIS — G89.29 CHRONIC PAIN OF RIGHT KNEE: ICD-10-CM

## 2023-05-08 NOTE — PROGRESS NOTES
Physical Therapy Daily Treatment Note    [facility]  [address] [place], [place] [address] [phone] (phone)  [phone] (fax)    Patient: Sharonda Preciado   : 1965  Diagnosis/ICD-10 Code:  Difficulty walking [R26.2]  Referring practitioner: Jeremy Meneses MD  Date of Initial Visit: Type: THERAPY  Noted: 3/17/2023  Today's Date: 2023  Patient seen for 17 sessions           Subjective   Patient reports her lateral thigh pain is better but her calf is really hurting/tight. It's making it hard to bend her knee.     Objective     See Exercise, Manual, and Modality Logs for complete treatment.     Dry needling, using threading and direct techniques, obtaining written and verbal consent to treat after discussing benefits and risks.   Patient position during treatment: prone then supine. Muscles treated: R lateral gastrocnemius and peroneals, soleus. Response: positive twitch response. Clean needle technique observed at all times, precautions for lung fields, neurovascular structures observed. Manual palpation and assessment performed before, during, and after session.    Assessment/Plan   Sharonda was unable to achieve full revolutions on [place] today. Her R knee is locking up on her due to lateral calf pain and peroneals pain/tightness. Dry needling was helpful in reducing her pain but she was still unable to bend her R knee past 90 degrees due to her lower leg feeling like a tight band that wants to snap. Will assess delayed response to needling and focus on regaining knee flexion ROM.            Timed:    Manual Therapy:         mins  09135;  Therapeutic Exercise:    10     mins  22940;     Neuromuscular Hari:        mins  02262;    Therapeutic Activity:     10     mins  53371;     Gait Training:           mins  08838;     Ultrasound:          mins  50437;    Electrical Stimulation:         mins  11343 ( );  Iontophoresis         mins 29010;  Aquatic Therapy         mins  88613;    Untimed:  Electrical Stimulation:         mins  89862 ( );  Traction:         mins  97012;   Dry Needling   (1-2 muscles)       mins 20560 (Self-pay)  Dry Needling (3-4 muscles)        mins 20561 (Self-pay)  Dry Needling Trial      10    mins DRYNDLTRIAL  (No Charge)    Timed Treatment:   20   mins   Total Treatment:     50   mins    Odette Arrieta PT  Physical Therapist    [place] License:[id number]

## 2023-05-15 ENCOUNTER — TREATMENT (OUTPATIENT)
Dept: PHYSICAL THERAPY | Facility: CLINIC | Age: 58
End: 2023-05-15
Payer: COMMERCIAL

## 2023-05-15 DIAGNOSIS — R26.2 DIFFICULTY WALKING: Primary | ICD-10-CM

## 2023-05-15 DIAGNOSIS — G89.29 CHRONIC PAIN OF RIGHT KNEE: ICD-10-CM

## 2023-05-15 DIAGNOSIS — M25.561 CHRONIC PAIN OF RIGHT KNEE: ICD-10-CM

## 2023-05-15 PROCEDURE — 97110 THERAPEUTIC EXERCISES: CPT | Performed by: PHYSICAL THERAPIST

## 2023-05-15 PROCEDURE — 97530 THERAPEUTIC ACTIVITIES: CPT | Performed by: PHYSICAL THERAPIST

## 2023-05-15 NOTE — PROGRESS NOTES
Re-Assessment + Discharge Summary      Patient: Sharonda Preciado   : 1965  Diagnosis/ICD-10 Code:  Difficulty walking [R26.2]  Referring practitioner: Jeremy Meneses MD  Date of Initial Visit: Type: THERAPY  Noted: 3/17/2023  Today's Date: 5/15/2023  Patient seen for 18 sessions      Subjective:   Sharonda Preciado reports: R knee is great feels like it is completely back to normal. I am having my L knee surgery 23.  Subjective Questionnaire: LEFS: 80/80  Clinical Progress: improved  Home Program Compliance: Yes  Treatment has included: therapeutic exercise, neuromuscular re-education, manual therapy, therapeutic activity, gait training, electrical stimulation and cryotherapy      Objective     Active Range of Motion   Left Knee   Flexion: 123 degrees   Extension: 0 degrees      Right Knee   Flexion: 121 degrees   Extension: 0 degrees      Strength     Right Knee  Flexion 5  Extension 5    Left Knee  Flexion: 5  Ext 4+     Right Knee Flexibility Comments:   Improved ITband flexibility, no calf tightness or discomfort after dry needling last week     Swelling      Left Knee Girth Measurement (cm)   Joint line: 43.5 cm     Right Knee Girth Measurement (cm)   Joint line: 43.5 cm     Ambulation      Comments   No AD, even step length, no significant antalgia    Assessment/Plan  Sharonda Preciado has been seen for 18 physical therapy sessions for right knee pain s/p TKA surgery on 3/15/23. Treatment has included therapeutic exercise, manual therapy, therapeutic activity and patient education with home exercise program and dry needling with modalities for pain control. Progress to physical therapy goals is good. Sharonda is walking without an AD and presents with full R knee flexion and extension AROM, no antalgia. Able to ascend/descend steps reciprocally with handrail. Full ROM on R 0-120 degrees and full strength. No swelling. Pt is appropriate for discharge to [facility] having met all goals and prepare for L TKA on  5/24/23.                Goals  Plan Goals: STGs to be completed within 30 days:  -Patient will demonstrate compliance and independence with initial HEP (MET)  -Patient will increase R Knee AROM to 5-110 degrees to help normalize gait mechanics and increase ease with transfers (MET)  -Patient will perform sit to stand transfer with equilateral WB and no UE assistance (MET)  -Patient will ambulate household distances with SC in order to reduce reliance on UE support with gait (MET)    LTGs to be completed within 90 days:  -Patient will increase R Knee AROM to 0-120 degrees to help normalize gait mechanics and increase ease with transfers (MET)  -Patient will complete community mobility without AD and with even step length and heel-toe gait mechanics (MET)  -Patient will reduce edema in R knee by 2 cm to help reduce pain/discomfort (MET)  -Patient will improve score on LEFS from 2 at eval to 40 or greater to improve quality of life (MET)  -Sharonda will descend stairs reciprocally with good confidence (MET)      PLAN  Discharge to HEP    Visit Diagnoses:    ICD-10-CM ICD-9-CM   1. Difficulty walking  R26.2 719.7   2. Chronic pain of right knee  M25.561 719.46    G89.29 338.29         PT Signature: Hilda Morrow PT  [place] license: [id number]      Timed:  Manual Therapy:    -     mins  38898;  Therapeutic Exercise:    30     mins  56251;     Neuromuscular Hari:    -    mins  37113;    Therapeutic Activity:     15     mins  19253;     Gait Training:      -     mins  00022;     Ultrasound:     -     mins  27516;    Electrical Stimulation:    -     mins  57162 ( );    Untimed:  Electrical Stimulation:    -     mins  97202 ( );  Mechanical Traction:    -     mins  43048; \  Dry needling:      -     mins  20560/20561    Timed Treatment:   45   mins   Total Treatment:     45   mins

## 2023-05-16 ENCOUNTER — PRE-ADMISSION TESTING (OUTPATIENT)
Dept: PREADMISSION TESTING | Facility: HOSPITAL | Age: 58
End: 2023-05-16
Payer: COMMERCIAL

## 2023-05-16 VITALS — HEIGHT: 62 IN | BODY MASS INDEX: 35.5 KG/M2 | WEIGHT: 192.9 LBS

## 2023-05-16 DIAGNOSIS — M17.12 PRIMARY OSTEOARTHRITIS OF LEFT KNEE: ICD-10-CM

## 2023-05-16 LAB
ABO GROUP BLD: NORMAL
ANION GAP SERPL CALCULATED.3IONS-SCNC: 11.9 MMOL/L (ref 5–15)
BACTERIA UR QL AUTO: ABNORMAL /HPF
BASOPHILS # BLD AUTO: 0.04 10*3/MM3 (ref 0–0.2)
BASOPHILS NFR BLD AUTO: 0.9 % (ref 0–1.5)
BILIRUB UR QL STRIP: ABNORMAL
BLD GP AB SCN SERPL QL: NEGATIVE
BUN SERPL-MCNC: 11 MG/DL (ref 6–20)
BUN/CREAT SERPL: 9.6 (ref 7–25)
CALCIUM SPEC-SCNC: 10 MG/DL (ref 8.6–10.5)
CHLORIDE SERPL-SCNC: 101 MMOL/L (ref 98–107)
CLARITY UR: CLEAR
CO2 SERPL-SCNC: 28.1 MMOL/L (ref 22–29)
COLOR UR: YELLOW
CREAT SERPL-MCNC: 1.15 MG/DL (ref 0.57–1)
DEPRECATED RDW RBC AUTO: 40.6 FL (ref 37–54)
EGFRCR SERPLBLD CKD-EPI 2021: 56 ML/MIN/1.73
EOSINOPHIL # BLD AUTO: 0.05 10*3/MM3 (ref 0–0.4)
EOSINOPHIL NFR BLD AUTO: 1.1 % (ref 0.3–6.2)
ERYTHROCYTE [DISTWIDTH] IN BLOOD BY AUTOMATED COUNT: 12.7 % (ref 12.3–15.4)
GLUCOSE SERPL-MCNC: 93 MG/DL (ref 65–99)
GLUCOSE UR STRIP-MCNC: NEGATIVE MG/DL
HBA1C MFR BLD: 4.8 % (ref 4.8–5.6)
HCT VFR BLD AUTO: 39.3 % (ref 34–46.6)
HGB BLD-MCNC: 12.5 G/DL (ref 12–15.9)
HGB UR QL STRIP.AUTO: NEGATIVE
HYALINE CASTS UR QL AUTO: ABNORMAL /LPF
IMM GRANULOCYTES # BLD AUTO: 0 10*3/MM3 (ref 0–0.05)
IMM GRANULOCYTES NFR BLD AUTO: 0 % (ref 0–0.5)
KETONES UR QL STRIP: NEGATIVE
LEUKOCYTE ESTERASE UR QL STRIP.AUTO: ABNORMAL
LYMPHOCYTES # BLD AUTO: 1.24 10*3/MM3 (ref 0.7–3.1)
LYMPHOCYTES NFR BLD AUTO: 26.5 % (ref 19.6–45.3)
MCH RBC QN AUTO: 27.4 PG (ref 26.6–33)
MCHC RBC AUTO-ENTMCNC: 31.8 G/DL (ref 31.5–35.7)
MCV RBC AUTO: 86 FL (ref 79–97)
MONOCYTES # BLD AUTO: 0.29 10*3/MM3 (ref 0.1–0.9)
MONOCYTES NFR BLD AUTO: 6.2 % (ref 5–12)
NEUTROPHILS NFR BLD AUTO: 3.06 10*3/MM3 (ref 1.7–7)
NEUTROPHILS NFR BLD AUTO: 65.3 % (ref 42.7–76)
NITRITE UR QL STRIP: NEGATIVE
PH UR STRIP.AUTO: 6 [PH] (ref 4.5–8)
PLATELET # BLD AUTO: 264 10*3/MM3 (ref 140–450)
PMV BLD AUTO: 9.7 FL (ref 6–12)
POTASSIUM SERPL-SCNC: 3.8 MMOL/L (ref 3.5–5.2)
PROT UR QL STRIP: ABNORMAL
RBC # BLD AUTO: 4.57 10*6/MM3 (ref 3.77–5.28)
RBC # UR STRIP: ABNORMAL /HPF
REF LAB TEST METHOD: ABNORMAL
RH BLD: POSITIVE
SODIUM SERPL-SCNC: 141 MMOL/L (ref 136–145)
SP GR UR STRIP: 1.02 (ref 1–1.03)
SQUAMOUS #/AREA URNS HPF: ABNORMAL /HPF
T&S EXPIRATION DATE: NORMAL
UROBILINOGEN UR QL STRIP: ABNORMAL
WBC # UR STRIP: ABNORMAL /HPF
WBC NRBC COR # BLD: 4.68 10*3/MM3 (ref 3.4–10.8)

## 2023-05-16 PROCEDURE — 83036 HEMOGLOBIN GLYCOSYLATED A1C: CPT | Performed by: INTERNAL MEDICINE

## 2023-05-16 PROCEDURE — 86900 BLOOD TYPING SEROLOGIC ABO: CPT | Performed by: INTERNAL MEDICINE

## 2023-05-16 PROCEDURE — 80048 BASIC METABOLIC PNL TOTAL CA: CPT | Performed by: INTERNAL MEDICINE

## 2023-05-16 PROCEDURE — 85025 COMPLETE CBC W/AUTO DIFF WBC: CPT | Performed by: INTERNAL MEDICINE

## 2023-05-16 PROCEDURE — 87081 CULTURE SCREEN ONLY: CPT | Performed by: INTERNAL MEDICINE

## 2023-05-16 PROCEDURE — 81001 URINALYSIS AUTO W/SCOPE: CPT | Performed by: INTERNAL MEDICINE

## 2023-05-16 PROCEDURE — 86850 RBC ANTIBODY SCREEN: CPT | Performed by: INTERNAL MEDICINE

## 2023-05-16 PROCEDURE — 86901 BLOOD TYPING SEROLOGIC RH(D): CPT | Performed by: INTERNAL MEDICINE

## 2023-05-16 PROCEDURE — 36415 COLL VENOUS BLD VENIPUNCTURE: CPT

## 2023-05-16 RX ORDER — MAGNESIUM GLUCONATE 27 MG(500)
27 TABLET ORAL 2 TIMES DAILY
COMMUNITY

## 2023-05-16 NOTE — PAT
Pt here for PAT visit.  Pre-op tests completed, chg soap given, and instructions reviewed.  Instructed clears until 2 hrs prior to arrival time, voiced understanding. Arrow pain pump reviewed and handout given. Pt with documented allergy to Nickel - office notified  
denies pain/discomfort (Rating = 0)

## 2023-05-16 NOTE — DISCHARGE INSTRUCTIONS
PRE-ADMISSION TESTING INSTRUCTIONS FOR TOTAL JOINT PATIENTS    Take these medications the morning of surgery with a small sip of water:  Levothyroxine, Wellbutrin, Lexapro      No aspirin, advil, aleve, ibuprofen, naproxen, diet pills, decongestants, or vitamin/herbal supplements for a week prior to your surgery.     Tylenol/Acetaminophen ok to take if needed.    Do not take any insulin or diabetes medications the morning of surgery.    Your surgeon may give you Bactroban to use prior to surgery. This will be started 5 days prior to surgery, follow the directions on your prescription from your surgeon for use.      General Instructions:    DO NOT EAT SOLID FOOD AFTER MIDNIGHT THE NIGHT BEFORE SURGERY. No gum, mints, or hard candy after midnight the night before surgery.  You may drink clear liquids the day of surgery up until 2 hours before your arrival time.  Clear liquids are liquids you can see through. Nothing RED in color.    Plain water    Sports drinks      Gelatin (Jell-O)  Fruit juices without pulp such as white grape juice and apple juice  Popsicles that contain no fruit or yogurt  Tea or coffee (no cream or milk added)    It is beneficial for you to have a clear drink that contains carbohydrates 2 hours prior to your arrival time.  DRINK A BOTTLE OF SPORTS DRINK 2 HOURS BEFORE YOUR ARRIVAL TIME. IF YOU ARE DIABETIC, DRINK A LOW OR NO SUGAR SPORTS DRINK. ANY COLOR EXCEPT RED.    Patients who avoid smoking, chewing tobacco and alcohol for 4 weeks prior to surgery have a reduced risk of post-operative complications.  If at all possible, quit smoking as many days before surgery as you can.    Do not smoke, use chewing tobacco or drink alcohol after midnight the day of surgery.    Bring your C-PAP/ BI-PAP machine if you use one.  Wear clean comfortable clothes.  Do not wear contact lenses, lotion, deodorant, or make-up.  Bring a case for your glasses if applicable.   You may brush your teeth the morning of  surgery.  You may wear dentures/partials, do not put adhesive/glue on them.  Leave all other jewelry and valuables at home.  NOTIFY YOUR SURGEON IF YOU BECOME ILL, HAVE A FEVER, PRODUCTIVE COUGH, OR CANNOT BE HERE THE DAY OF SURGERY.      Preventing a Surgical Site Infection:    Shower the night before and on the morning of surgery using the chlorhexidine soap you were given.  Use a clean washcloth with the soap.  Place clean sheets on your bed after showering the night before surgery. Do not use the CHG soap on your hair, face, or private areas. Wash your body gently for five (5) minutes. Do not scrub your skin.  Dry with a clean towel and dress in clean clothing.    Do not shave the surgical area for 10 days-2 weeks prior to surgery  because the razor can irritate skin and make it easier to develop an infection.  Make sure you, your family, and all healthcare providers clean their hands with soap and water or an alcohol based hand  before caring for you or your wound.      Day of surgery:    Your surgeon’s office will advise you of your arrival time for the day of surgery.    Upon arrival, a Pre-op nurse and Anesthesia provider will review your health history, obtain vital signs, and answer questions you may have. The anesthesia provider will also discuss the type of anesthesia that will be needed for your procedure, which may include general anesthesia. The only belongings needed at this time will be your home medications and if applicable your C-PAP/BI-PAP machine.  If you are staying overnight your family can leave the rest of your belongings in the car and bring them to your room later.  A Pre-op nurse will start an IV and you may receive medication in preparation for surgery, including something to help you relax.  Your family will be able to see you in the Pre-op area.  While you are in surgery your family should notify the waiting room  if they leave the waiting room area and provide a  contact phone number.    If you have any questions, you can call the Pre-Admission Department at [phone] or your surgeon's office.    Please be aware that surgery does come with discomfort.  We want to make every effort to control your discomfort so please discuss any uncontrolled symptoms with your nurse.   Your doctor will most likely have prescribed pain medications.     You may have bruising or discomfort from the tourniquet used in surgery.     Please leave all luggage in the car the morning of surgery.  You will be transported to your hospital room following the recovery period.  Your family can get your luggage at that time.      You may receive a survey regarding the care you received. Your feedback is very important and will be used to collect the necessary data to help us to continue to provide excellent care.

## 2023-05-17 LAB — MRSA SPEC QL CULT: NORMAL

## 2023-05-18 ENCOUNTER — PREP FOR SURGERY (OUTPATIENT)
Dept: OTHER | Facility: HOSPITAL | Age: 58
End: 2023-05-18
Payer: COMMERCIAL

## 2023-05-19 ENCOUNTER — PREP FOR SURGERY (OUTPATIENT)
Dept: OTHER | Facility: HOSPITAL | Age: 58
End: 2023-05-19
Payer: COMMERCIAL

## 2023-05-19 DIAGNOSIS — M17.12 PRIMARY OSTEOARTHRITIS OF LEFT KNEE: Primary | ICD-10-CM

## 2023-05-22 ENCOUNTER — OFFICE VISIT (OUTPATIENT)
Dept: ORTHOPEDIC SURGERY | Facility: CLINIC | Age: 58
End: 2023-05-22
Payer: COMMERCIAL

## 2023-05-22 VITALS — BODY MASS INDEX: 35.33 KG/M2 | WEIGHT: 192 LBS | HEIGHT: 62 IN

## 2023-05-22 DIAGNOSIS — M17.12 PRIMARY OSTEOARTHRITIS OF LEFT KNEE: Primary | ICD-10-CM

## 2023-05-22 RX ORDER — SEMAGLUTIDE 2.4 MG/.75ML
INJECTION, SOLUTION SUBCUTANEOUS
COMMUNITY
Start: 2023-04-22

## 2023-05-22 ASSESSMENT — KOOS JR
KOOS JR SCORE: 100
KOOS JR SCORE: 0

## 2023-05-22 NOTE — H&P
[facility]   HISTORY AND PHYSICAL    Patient Name:Sharonda Preciado  : 1965  MRN: [id number]  Primary Care Physician: Montse Vela MD  Date of admission: (Not on file)    Subjective   Subjective     Chief Complaint: left knee pain    History of Present Illness   Sharonda Preciado is a 57 y.o. female with a longstanding history of bilateral knee pain.  The patient underwent a right total knee arthroplasty by myself roughly 2-1/2 months ago.  She made a complete recovery and is thrilled with the results.  She has no pain and full range of motion.  She has been discharged from physical therapy.  At last visit when she saw me the decision was made to proceed with left total knee arthroplasty as well.  The patient presents today for preop visit in preparation for left total knee on Wednesday, 2023.  The patient has undergone multiple conservative treatments for her left knee including intra-articular corticosteroid injections, physical therapy, activity modification, and significant weight loss.      Review of Systems      Personal History     Past Medical History:   Diagnosis Date   • Anemia    • Anticardiolipin antibody positive    • Antiphospholipid antibody positive    • Arthritis    • Hypertension    • Hypothyroid    • Knee swelling    • Sleep apnea with use of continuous positive airway pressure (CPAP)        Past Surgical History:   Procedure Laterality Date   •  SECTION     • COLONOSCOPY     • DILATATION AND CURETTAGE     • KNEE SURGERY     • TOTAL KNEE ARTHROPLASTY Right 03/15/2023    Procedure: TOTAL KNEE ARTHROPLASTY WITH CORI ROBOT;  Surgeon: Jeremy Meneses MD;  Location: [facility];  Service: Robotics - Ortho;  Laterality: Right;  RIGHT TOTAL KNEE ARTHROPLASTY WITH CORI ROBOT       Family History: Her family history includes Diabetes in her mother; Heart disease in her father and mother; Rheumatologic disease in her maternal grandmother and maternal uncle.     Social History: She   reports that she has never smoked. She has never used smokeless tobacco. She reports that she does not drink alcohol and does not use drugs.    Home Medications:  Multiple Vitamins-Minerals, Semaglutide-Weight Management, amphetamine-dextroamphetamine, buPROPion XL, ergocalciferol, escitalopram, fluticasone, levothyroxine, loratadine, magnesium gluconate, and triamterene-hydrochlorothiazide    Allergies:  She is allergic to nickel and shellfish allergy.    Objective    Objective     Vitals:         Physical Exam    Right Knee Exam      Tenderness   The patient is experiencing no tenderness.      Range of Motion   Extension: 0   Flexion: 120      Tests   Varus: negative Valgus: negative  Drawer:  Anterior - negative    Posterior - negative     Other   Erythema: absent  Scars: present  Sensation: normal  Pulse: present  Swelling: none  Effusion: no effusion present        Left Knee Exam      Tenderness   The patient is experiencing tenderness in the medial retinaculum, medial joint line and medial hamstring.     Range of Motion   Extension: 5   Flexion: 120      Tests   Darrell:  Medial - positive   Varus: negative Valgus: negative  Drawer:  Anterior - negative     Posterior - negative     Other   Erythema: absent  Scars: absent  Sensation: normal  Pulse: present  Swelling: mild  Effusion: no effusion present    Result Review    Imagin standing views of the left knee   Indication: Left knee pain  Findings: X-rays demonstrate severe tricompartmental osteoarthritis of the left knee most pronounced in the medial compartment with bone-on-bone articulation subchondral sclerosis and cystic changes as well as marginal osteophytes.  There is no acute fracture noted.  Comparative studies: Plain radiographs prior to her right total knee    Assessment & Plan   Assessment / Plan     Brief Patient Summary:  Sharonda Preciado is a 57 y.o. female with severe left knee osteoarthritis    Active Hospital Problems:  There are no active  hospital problems to display for this patient.    Plan:   Cc: f/u left knee pain, DJD    Patient presented to clinic today for preoperative history and physical visit in anticipation of upcoming scheduled left total knee arthroplasty.    I reviewed anatomy and a model of a total knee arthroplasty, as well as typical postoperative recovery of 6-12 months before maximal recovery, and possible need for rehabilitation stay after hospitalization. We also discussed risks, benefits, and alternatives of procedure with risks including but not limited to neurovascular damage, bleeding, infection, malalignment, chronic pian, failure of implants, osteolysis, loosening of implants, loss of motion, weakness, stiffness, instability, DVT, pulmonary embolus, death, stroke, complex regional pain syndrome, myocardial infarction, and need for additional procedures. Patient understood all these and had all questions answered before consenting to the procedure. No guarantees were given in regards to results from the surgery.  Patient has been medically optimize by his primary care physician.    Plan for left total knee arthroplasty.    Implants: [employer] cemented Legion TKS with [employer] (Nickel allergy)  Anticoagulation: Asprin  Antibiotics: Cefazolin and Vanc  Admission Type: 23 HR Obs  Post op ABX: Yes  TXA: Yes     Patient mentioned to me that she does not need a prescription for senna, aspirin, or Zofran following surgery since she still has them at home from her right total knee    All remaining questions answered today.      DVT prophylaxis:  No DVT prophylaxis order currently exists.    Jeremy Meneses MD

## 2023-05-22 NOTE — H&P (VIEW-ONLY)
[facility]   HISTORY AND PHYSICAL    Patient Name:Sharonda Preciado  : 1965  MRN: [id number]  Primary Care Physician: Montse Vela MD  Date of admission: (Not on file)    Subjective   Subjective     Chief Complaint: left knee pain    History of Present Illness   Sharonda Preciado is a 57 y.o. female with a longstanding history of bilateral knee pain.  The patient underwent a right total knee arthroplasty by myself roughly 2-1/2 months ago.  She made a complete recovery and is thrilled with the results.  She has no pain and full range of motion.  She has been discharged from physical therapy.  At last visit when she saw me the decision was made to proceed with left total knee arthroplasty as well.  The patient presents today for preop visit in preparation for left total knee on Wednesday, 2023.  The patient has undergone multiple conservative treatments for her left knee including intra-articular corticosteroid injections, physical therapy, activity modification, and significant weight loss.      Review of Systems      Personal History     Past Medical History:   Diagnosis Date   • Anemia    • Anticardiolipin antibody positive    • Antiphospholipid antibody positive    • Arthritis    • Hypertension    • Hypothyroid    • Knee swelling    • Sleep apnea with use of continuous positive airway pressure (CPAP)        Past Surgical History:   Procedure Laterality Date   •  SECTION     • COLONOSCOPY     • DILATATION AND CURETTAGE     • KNEE SURGERY     • TOTAL KNEE ARTHROPLASTY Right 03/15/2023    Procedure: TOTAL KNEE ARTHROPLASTY WITH CORI ROBOT;  Surgeon: Jeremy Meneses MD;  Location: [facility];  Service: Robotics - Ortho;  Laterality: Right;  RIGHT TOTAL KNEE ARTHROPLASTY WITH CORI ROBOT       Family History: Her family history includes Diabetes in her mother; Heart disease in her father and mother; Rheumatologic disease in her maternal grandmother and maternal uncle.     Social History: She   reports that she has never smoked. She has never used smokeless tobacco. She reports that she does not drink alcohol and does not use drugs.    Home Medications:  Multiple Vitamins-Minerals, Semaglutide-Weight Management, amphetamine-dextroamphetamine, buPROPion XL, ergocalciferol, escitalopram, fluticasone, levothyroxine, loratadine, magnesium gluconate, and triamterene-hydrochlorothiazide    Allergies:  She is allergic to nickel and shellfish allergy.    Objective    Objective     Vitals:         Physical Exam    Right Knee Exam      Tenderness   The patient is experiencing no tenderness.      Range of Motion   Extension: 0   Flexion: 120      Tests   Varus: negative Valgus: negative  Drawer:  Anterior - negative    Posterior - negative     Other   Erythema: absent  Scars: present  Sensation: normal  Pulse: present  Swelling: none  Effusion: no effusion present        Left Knee Exam      Tenderness   The patient is experiencing tenderness in the medial retinaculum, medial joint line and medial hamstring.     Range of Motion   Extension: 5   Flexion: 120      Tests   Darrell:  Medial - positive   Varus: negative Valgus: negative  Drawer:  Anterior - negative     Posterior - negative     Other   Erythema: absent  Scars: absent  Sensation: normal  Pulse: present  Swelling: mild  Effusion: no effusion present    Result Review    Imagin standing views of the left knee   Indication: Left knee pain  Findings: X-rays demonstrate severe tricompartmental osteoarthritis of the left knee most pronounced in the medial compartment with bone-on-bone articulation subchondral sclerosis and cystic changes as well as marginal osteophytes.  There is no acute fracture noted.  Comparative studies: Plain radiographs prior to her right total knee    Assessment & Plan   Assessment / Plan     Brief Patient Summary:  Sharonda Preciado is a 57 y.o. female with severe left knee osteoarthritis    Active Hospital Problems:  There are no active  hospital problems to display for this patient.    Plan:   Cc: f/u left knee pain, DJD    Patient presented to clinic today for preoperative history and physical visit in anticipation of upcoming scheduled left total knee arthroplasty.    I reviewed anatomy and a model of a total knee arthroplasty, as well as typical postoperative recovery of 6-12 months before maximal recovery, and possible need for rehabilitation stay after hospitalization. We also discussed risks, benefits, and alternatives of procedure with risks including but not limited to neurovascular damage, bleeding, infection, malalignment, chronic pian, failure of implants, osteolysis, loosening of implants, loss of motion, weakness, stiffness, instability, DVT, pulmonary embolus, death, stroke, complex regional pain syndrome, myocardial infarction, and need for additional procedures. Patient understood all these and had all questions answered before consenting to the procedure. No guarantees were given in regards to results from the surgery.  Patient has been medically optimize by his primary care physician.    Plan for left total knee arthroplasty.    Implants: [employer] cemented Legion TKS with [employer] (Nickel allergy)  Anticoagulation: Asprin  Antibiotics: Cefazolin and Vanc  Admission Type: 23 HR Obs  Post op ABX: Yes  TXA: Yes     Patient mentioned to me that she does not need a prescription for senna, aspirin, or Zofran following surgery since she still has them at home from her right total knee    All remaining questions answered today.      DVT prophylaxis:  No DVT prophylaxis order currently exists.    Jeremy Meneses MD

## 2023-05-22 NOTE — PROGRESS NOTES
"Subjective:     Patient ID: Sharonda Preciado is a 57 y.o. female.    Chief Complaint:    History of Present Illness  Sharonda Preciado {presents/returns:09508} to clinic today for evaluation of ***     Social History     Occupational History   • Not on file   Tobacco Use   • Smoking status: Never   • Smokeless tobacco: Never   Vaping Use   • Vaping Use: Never used   Substance and Sexual Activity   • Alcohol use: No   • Drug use: Never   • Sexual activity: Not Currently     Partners: Male     Birth control/protection: None      Past Medical History:   Diagnosis Date   • Anemia    • Anticardiolipin antibody positive    • Antiphospholipid antibody positive    • Arthritis    • Hypertension    • Hypothyroid    • Knee swelling    • Sleep apnea with use of continuous positive airway pressure (CPAP)      Past Surgical History:   Procedure Laterality Date   •  SECTION     • COLONOSCOPY     • DILATATION AND CURETTAGE     • KNEE SURGERY     • TOTAL KNEE ARTHROPLASTY Right 03/15/2023    Procedure: TOTAL KNEE ARTHROPLASTY WITH CORI ROBOT;  Surgeon: Jeremy Meneses MD;  Location: [facility];  Service: Robotics - Ortho;  Laterality: Right;  RIGHT TOTAL KNEE ARTHROPLASTY WITH CORI ROBOT       Family History   Problem Relation Age of Onset   • Heart disease Mother    • Diabetes Mother    • Heart disease Father    • Rheumatologic disease Maternal Uncle         Sjorgrens   • Rheumatologic disease Maternal Grandmother         RA   • Malig Hyperthermia Neg Hx                  Objective:  Vitals:    23 1035   Weight: 87.1 kg (192 lb)   Height: 157.5 cm (62\")         23  1035   Weight: 87.1 kg (192 lb)     Body mass index is 35.12 kg/m².  ***      Ortho Exam     ***  Imaging: ***  Indication: ***  Findings: ***  Comparative studies: ***    Assessment:      No diagnosis found.       Plan:          1. Discussed treatment options at length with patient at today's visit. ***  2. Follow up: ***      Sharonda Preciado was in " agreement with plan and had all questions answered.     Medications:  No orders of the defined types were placed in this encounter.      Followup:  No follow-ups on file.    There are no diagnoses linked to this encounter.      Dictated utilizing Dragon dictation

## 2023-05-23 ENCOUNTER — ANESTHESIA EVENT (OUTPATIENT)
Dept: PERIOP | Facility: HOSPITAL | Age: 58
End: 2023-05-23
Payer: COMMERCIAL

## 2023-05-24 ENCOUNTER — ANESTHESIA (OUTPATIENT)
Dept: PERIOP | Facility: HOSPITAL | Age: 58
End: 2023-05-24
Payer: COMMERCIAL

## 2023-05-24 ENCOUNTER — APPOINTMENT (OUTPATIENT)
Dept: GENERAL RADIOLOGY | Facility: HOSPITAL | Age: 58
End: 2023-05-24
Payer: COMMERCIAL

## 2023-05-24 ENCOUNTER — HOSPITAL ENCOUNTER (OUTPATIENT)
Facility: HOSPITAL | Age: 58
Discharge: HOME | End: 2023-05-24
Attending: INTERNAL MEDICINE | Admitting: INTERNAL MEDICINE
Payer: COMMERCIAL

## 2023-05-24 VITALS
WEIGHT: 186.6 LBS | TEMPERATURE: 97.9 F | BODY MASS INDEX: 34.13 KG/M2 | RESPIRATION RATE: 20 BRPM | DIASTOLIC BLOOD PRESSURE: 91 MMHG | HEART RATE: 87 BPM | OXYGEN SATURATION: 100 % | SYSTOLIC BLOOD PRESSURE: 125 MMHG

## 2023-05-24 DIAGNOSIS — M17.12 PRIMARY OSTEOARTHRITIS OF LEFT KNEE: ICD-10-CM

## 2023-05-24 PROCEDURE — G0378 HOSPITAL OBSERVATION PER HR: HCPCS

## 2023-05-24 PROCEDURE — 25010000002 ROPIVACAINE PER 1 MG: Performed by: INTERNAL MEDICINE

## 2023-05-24 PROCEDURE — 25010000002 VANCOMYCIN HCL 1.25 G RECONSTITUTED SOLUTION 1 EACH VIAL: Performed by: INTERNAL MEDICINE

## 2023-05-24 PROCEDURE — C1776 JOINT DEVICE (IMPLANTABLE): HCPCS | Performed by: INTERNAL MEDICINE

## 2023-05-24 PROCEDURE — 25010000002 ONDANSETRON PER 1 MG: Performed by: NURSE ANESTHETIST, CERTIFIED REGISTERED

## 2023-05-24 PROCEDURE — 25010000002 PHENYLEPHRINE 10 MG/ML SOLUTION: Performed by: NURSE ANESTHETIST, CERTIFIED REGISTERED

## 2023-05-24 PROCEDURE — 97161 PT EVAL LOW COMPLEX 20 MIN: CPT

## 2023-05-24 PROCEDURE — C1713 ANCHOR/SCREW BN/BN,TIS/BN: HCPCS | Performed by: INTERNAL MEDICINE

## 2023-05-24 PROCEDURE — 25010000002 EPINEPHRINE 1 MG/ML SOLUTION 1 ML VIAL: Performed by: INTERNAL MEDICINE

## 2023-05-24 PROCEDURE — 25010000002 MIDAZOLAM PER 1MG: Performed by: NURSE ANESTHETIST, CERTIFIED REGISTERED

## 2023-05-24 PROCEDURE — 0 CEFAZOLIN SODIUM-DEXTROSE 2-3 GM-%(50ML) RECONSTITUTED SOLUTION: Performed by: INTERNAL MEDICINE

## 2023-05-24 PROCEDURE — 25010000002 KETOROLAC TROMETHAMINE PER 15 MG: Performed by: INTERNAL MEDICINE

## 2023-05-24 PROCEDURE — 25010000002 DEXAMETHASONE PER 1 MG: Performed by: NURSE ANESTHETIST, CERTIFIED REGISTERED

## 2023-05-24 PROCEDURE — 25010000002 PROPOFOL 10 MG/ML EMULSION: Performed by: NURSE ANESTHETIST, CERTIFIED REGISTERED

## 2023-05-24 PROCEDURE — 73560 X-RAY EXAM OF KNEE 1 OR 2: CPT

## 2023-05-24 DEVICE — LEGION CRUCIATE RETAINING OXINIUM                                    FEMORAL SIZE 4 LEFT
Type: IMPLANTABLE DEVICE | Site: KNEE | Status: FUNCTIONAL
Brand: LEGION

## 2023-05-24 DEVICE — DEV CONTRL TISS STRATAFIX SPIRAL PDO BIDIR 1 36X36CM: Type: IMPLANTABLE DEVICE | Site: KNEE | Status: FUNCTIONAL

## 2023-05-24 DEVICE — KNOTLESS TISSUE CONTROL DEVICE, UNDYED UNIDIRECTIONAL (ANTIBACTERIAL) SYNTHETIC ABSORBABLE DEVICE
Type: IMPLANTABLE DEVICE | Site: KNEE | Status: FUNCTIONAL
Brand: STRATAFIX

## 2023-05-24 DEVICE — LEGION CRUCIATE RETAINING HIGH                                    FLEX HIGHLY CROSS LINKED                                    POLYETHYLENE SIZE 3-4 11MM
Type: IMPLANTABLE DEVICE | Site: KNEE | Status: FUNCTIONAL
Brand: LEGION

## 2023-05-24 DEVICE — KNOTLESS TISSUE CONTROL DEVICE, VIOLET UNIDIRECTIONAL (ANTIBACTERIAL) SYNTHETIC ABSORBABLE DEVICE
Type: IMPLANTABLE DEVICE | Site: KNEE | Status: FUNCTIONAL
Brand: STRATAFIX

## 2023-05-24 DEVICE — GENESIS II NON-POROUS TIBIAL                                    BASEPLATE SIZE 3 LEFT
Type: IMPLANTABLE DEVICE | Site: KNEE | Status: FUNCTIONAL
Brand: GENESIS II

## 2023-05-24 DEVICE — PALACOS® MV IS INTENDED FOR USE IN ARTHROPLASTIC PROCEDURES OF THE HIP, KNEE, AND OTHER JOINTS FOR THE FIXATION OF POLYMER OR METALLIC PROSTHETIC IMPLANTS TO LIVING BONE. PALACOS® MV CONTAINS THE X-RAY CONTRAST MEDIUM ZIRCONIUM DIOXIDE. TO IMPROVE VISIBILITY IN THE SURGICAL FIELD PALACOS® MV HAS BEEN COLOURED WITH CHLOROPHYLL (E141).THE BONE CEMENT IS PREPARED DIRECTLY BEFORE USE BY MIXING A POLYMER POWDER COMPONENT WITH A LIQUID MONOMER COMPONENT. A DUCTILE DOUGH FORMS WHICH CURES WITHIN A FEW MINUTES.
Type: IMPLANTABLE DEVICE | Site: KNEE | Status: FUNCTIONAL
Brand: PALACOS®

## 2023-05-24 DEVICE — IMPLANTABLE DEVICE: Type: IMPLANTABLE DEVICE | Site: KNEE | Status: FUNCTIONAL

## 2023-05-24 RX ORDER — SODIUM CHLORIDE, SODIUM LACTATE, POTASSIUM CHLORIDE, CALCIUM CHLORIDE 600; 310; 30; 20 MG/100ML; MG/100ML; MG/100ML; MG/100ML
100 INJECTION, SOLUTION INTRAVENOUS CONTINUOUS
Status: DISCONTINUED | OUTPATIENT
Start: 2023-05-24 | End: 2023-05-24 | Stop reason: HOSPADM

## 2023-05-24 RX ORDER — ESCITALOPRAM OXALATE 10 MG/1
20 TABLET ORAL DAILY
Status: DISCONTINUED | OUTPATIENT
Start: 2023-05-24 | End: 2023-05-24 | Stop reason: HOSPADM

## 2023-05-24 RX ORDER — ACETAMINOPHEN 500 MG
1000 TABLET ORAL 3 TIMES DAILY
Status: DISCONTINUED | OUTPATIENT
Start: 2023-05-24 | End: 2023-05-24 | Stop reason: HOSPADM

## 2023-05-24 RX ORDER — FAMOTIDINE 20 MG/1
40 TABLET, FILM COATED ORAL DAILY
Status: DISCONTINUED | OUTPATIENT
Start: 2023-05-24 | End: 2023-05-24 | Stop reason: HOSPADM

## 2023-05-24 RX ORDER — DEXAMETHASONE SODIUM PHOSPHATE 4 MG/ML
8 INJECTION, SOLUTION INTRA-ARTICULAR; INTRALESIONAL; INTRAMUSCULAR; INTRAVENOUS; SOFT TISSUE ONCE AS NEEDED
Status: COMPLETED | OUTPATIENT
Start: 2023-05-24 | End: 2023-05-24

## 2023-05-24 RX ORDER — ASPIRIN 81 MG/1
81 TABLET ORAL EVERY 12 HOURS SCHEDULED
Status: DISCONTINUED | OUTPATIENT
Start: 2023-05-25 | End: 2023-05-24 | Stop reason: SDUPTHER

## 2023-05-24 RX ORDER — FLUTICASONE PROPIONATE 50 MCG
1 SPRAY, SUSPENSION (ML) NASAL DAILY PRN
Status: DISCONTINUED | OUTPATIENT
Start: 2023-05-24 | End: 2023-05-24 | Stop reason: HOSPADM

## 2023-05-24 RX ORDER — CHLORHEXIDINE GLUCONATE 500 MG/1
CLOTH TOPICAL ONCE
Status: DISCONTINUED | OUTPATIENT
Start: 2023-05-24 | End: 2023-05-24 | Stop reason: HOSPADM

## 2023-05-24 RX ORDER — TRIAMTERENE AND HYDROCHLOROTHIAZIDE 37.5; 25 MG/1; MG/1
1 TABLET ORAL DAILY
Status: DISCONTINUED | OUTPATIENT
Start: 2023-05-24 | End: 2023-05-24 | Stop reason: HOSPADM

## 2023-05-24 RX ORDER — KETAMINE HYDROCHLORIDE 10 MG/ML
INJECTION INTRAMUSCULAR; INTRAVENOUS AS NEEDED
Status: DISCONTINUED | OUTPATIENT
Start: 2023-05-24 | End: 2023-05-24 | Stop reason: SURG

## 2023-05-24 RX ORDER — BUPIVACAINE HYDROCHLORIDE 2.5 MG/ML
INJECTION, SOLUTION EPIDURAL; INFILTRATION; INTRACAUDAL
Status: COMPLETED | OUTPATIENT
Start: 2023-05-24 | End: 2023-05-24

## 2023-05-24 RX ORDER — OXYCODONE AND ACETAMINOPHEN 7.5; 325 MG/1; MG/1
1 TABLET ORAL EVERY 4 HOURS PRN
Qty: 30 TABLET | Refills: 0 | Status: SHIPPED | OUTPATIENT
Start: 2023-05-24

## 2023-05-24 RX ORDER — FAMOTIDINE 10 MG/ML
20 INJECTION, SOLUTION INTRAVENOUS
Status: COMPLETED | OUTPATIENT
Start: 2023-05-24 | End: 2023-05-24

## 2023-05-24 RX ORDER — ONDANSETRON 2 MG/ML
4 INJECTION INTRAMUSCULAR; INTRAVENOUS ONCE AS NEEDED
Status: COMPLETED | OUTPATIENT
Start: 2023-05-24 | End: 2023-05-24

## 2023-05-24 RX ORDER — DEXMEDETOMIDINE HYDROCHLORIDE 100 UG/ML
INJECTION, SOLUTION INTRAVENOUS AS NEEDED
Status: DISCONTINUED | OUTPATIENT
Start: 2023-05-24 | End: 2023-05-24 | Stop reason: SURG

## 2023-05-24 RX ORDER — PREGABALIN 75 MG/1
150 CAPSULE ORAL ONCE
Status: COMPLETED | OUTPATIENT
Start: 2023-05-24 | End: 2023-05-24

## 2023-05-24 RX ORDER — CEFAZOLIN SODIUM 2 G/50ML
2 SOLUTION INTRAVENOUS EVERY 8 HOURS
Status: DISCONTINUED | OUTPATIENT
Start: 2023-05-24 | End: 2023-05-24 | Stop reason: HOSPADM

## 2023-05-24 RX ORDER — DOXYCYCLINE HYCLATE 100 MG/1
100 CAPSULE ORAL 2 TIMES DAILY
Qty: 28 CAPSULE | Refills: 0 | Status: SHIPPED | OUTPATIENT
Start: 2023-05-24 | End: 2023-06-07

## 2023-05-24 RX ORDER — MIDAZOLAM HYDROCHLORIDE 2 MG/2ML
1 INJECTION, SOLUTION INTRAMUSCULAR; INTRAVENOUS
Status: DISCONTINUED | OUTPATIENT
Start: 2023-05-24 | End: 2023-05-24 | Stop reason: HOSPADM

## 2023-05-24 RX ORDER — MELOXICAM 7.5 MG/1
15 TABLET ORAL ONCE
Status: COMPLETED | OUTPATIENT
Start: 2023-05-24 | End: 2023-05-24

## 2023-05-24 RX ORDER — TRANEXAMIC ACID 10 MG/ML
1000 INJECTION, SOLUTION INTRAVENOUS ONCE
Status: COMPLETED | OUTPATIENT
Start: 2023-05-24 | End: 2023-05-24

## 2023-05-24 RX ORDER — SODIUM CHLORIDE, SODIUM LACTATE, POTASSIUM CHLORIDE, CALCIUM CHLORIDE 600; 310; 30; 20 MG/100ML; MG/100ML; MG/100ML; MG/100ML
9 INJECTION, SOLUTION INTRAVENOUS CONTINUOUS PRN
Status: DISCONTINUED | OUTPATIENT
Start: 2023-05-24 | End: 2023-05-24 | Stop reason: HOSPADM

## 2023-05-24 RX ORDER — SODIUM CHLORIDE 0.9 % (FLUSH) 0.9 %
10 SYRINGE (ML) INJECTION EVERY 12 HOURS SCHEDULED
Status: DISCONTINUED | OUTPATIENT
Start: 2023-05-24 | End: 2023-05-24 | Stop reason: HOSPADM

## 2023-05-24 RX ORDER — SODIUM CHLORIDE 9 MG/ML
40 INJECTION, SOLUTION INTRAVENOUS AS NEEDED
Status: DISCONTINUED | OUTPATIENT
Start: 2023-05-24 | End: 2023-05-24 | Stop reason: HOSPADM

## 2023-05-24 RX ORDER — MELOXICAM 7.5 MG/1
15 TABLET ORAL DAILY
Status: DISCONTINUED | OUTPATIENT
Start: 2023-05-24 | End: 2023-05-24 | Stop reason: HOSPADM

## 2023-05-24 RX ORDER — ONDANSETRON 2 MG/ML
4 INJECTION INTRAMUSCULAR; INTRAVENOUS EVERY 6 HOURS PRN
Status: DISCONTINUED | OUTPATIENT
Start: 2023-05-24 | End: 2023-05-24 | Stop reason: HOSPADM

## 2023-05-24 RX ORDER — LIDOCAINE HYDROCHLORIDE 20 MG/ML
INJECTION, SOLUTION INFILTRATION; PERINEURAL AS NEEDED
Status: DISCONTINUED | OUTPATIENT
Start: 2023-05-24 | End: 2023-05-24 | Stop reason: SURG

## 2023-05-24 RX ORDER — OXYCODONE HYDROCHLORIDE 5 MG/1
10 TABLET ORAL EVERY 4 HOURS PRN
Status: DISCONTINUED | OUTPATIENT
Start: 2023-05-24 | End: 2023-05-24 | Stop reason: HOSPADM

## 2023-05-24 RX ORDER — NALOXONE HYDROCHLORIDE 4 MG/.1ML
1 SPRAY NASAL SEE ADMIN INSTRUCTIONS
Qty: 2 EACH | Refills: 0 | Status: SHIPPED | OUTPATIENT
Start: 2023-05-24

## 2023-05-24 RX ORDER — BUPIVACAINE HYDROCHLORIDE 5 MG/ML
INJECTION, SOLUTION PERINEURAL
Status: COMPLETED | OUTPATIENT
Start: 2023-05-24 | End: 2023-05-24

## 2023-05-24 RX ORDER — BUPROPION HYDROCHLORIDE 150 MG/1
150 TABLET ORAL DAILY
Status: DISCONTINUED | OUTPATIENT
Start: 2023-05-24 | End: 2023-05-24 | Stop reason: HOSPADM

## 2023-05-24 RX ORDER — LIDOCAINE HYDROCHLORIDE 10 MG/ML
0.5 INJECTION, SOLUTION EPIDURAL; INFILTRATION; INTRACAUDAL; PERINEURAL ONCE AS NEEDED
Status: DISCONTINUED | OUTPATIENT
Start: 2023-05-24 | End: 2023-05-24 | Stop reason: HOSPADM

## 2023-05-24 RX ORDER — PHENYLEPHRINE HYDROCHLORIDE 10 MG/ML
INJECTION INTRAVENOUS AS NEEDED
Status: DISCONTINUED | OUTPATIENT
Start: 2023-05-24 | End: 2023-05-24 | Stop reason: SURG

## 2023-05-24 RX ORDER — LEVOTHYROXINE SODIUM 0.1 MG/1
200 TABLET ORAL DAILY
Status: DISCONTINUED | OUTPATIENT
Start: 2023-05-24 | End: 2023-05-24 | Stop reason: HOSPADM

## 2023-05-24 RX ORDER — ONDANSETRON 2 MG/ML
4 INJECTION INTRAMUSCULAR; INTRAVENOUS ONCE AS NEEDED
Status: DISCONTINUED | OUTPATIENT
Start: 2023-05-24 | End: 2023-05-24 | Stop reason: HOSPADM

## 2023-05-24 RX ORDER — CEFAZOLIN SODIUM 2 G/50ML
2 SOLUTION INTRAVENOUS ONCE
Status: COMPLETED | OUTPATIENT
Start: 2023-05-24 | End: 2023-05-24

## 2023-05-24 RX ORDER — SODIUM CHLORIDE 0.9 % (FLUSH) 0.9 %
10 SYRINGE (ML) INJECTION AS NEEDED
Status: DISCONTINUED | OUTPATIENT
Start: 2023-05-24 | End: 2023-05-24 | Stop reason: HOSPADM

## 2023-05-24 RX ORDER — MAGNESIUM HYDROXIDE 1200 MG/15ML
LIQUID ORAL AS NEEDED
Status: DISCONTINUED | OUTPATIENT
Start: 2023-05-24 | End: 2023-05-24 | Stop reason: HOSPADM

## 2023-05-24 RX ORDER — TRANEXAMIC ACID 10 MG/ML
1000 INJECTION, SOLUTION INTRAVENOUS ONCE
Status: DISCONTINUED | OUTPATIENT
Start: 2023-05-24 | End: 2023-05-24 | Stop reason: HOSPADM

## 2023-05-24 RX ORDER — FENTANYL CITRATE 50 UG/ML
25 INJECTION, SOLUTION INTRAMUSCULAR; INTRAVENOUS
Status: DISCONTINUED | OUTPATIENT
Start: 2023-05-24 | End: 2023-05-24 | Stop reason: HOSPADM

## 2023-05-24 RX ORDER — ONDANSETRON 4 MG/1
4 TABLET, FILM COATED ORAL EVERY 6 HOURS PRN
Status: DISCONTINUED | OUTPATIENT
Start: 2023-05-24 | End: 2023-05-24 | Stop reason: HOSPADM

## 2023-05-24 RX ORDER — CEFAZOLIN SODIUM 2 G/50ML
2 SOLUTION INTRAVENOUS EVERY 8 HOURS
Status: DISCONTINUED | OUTPATIENT
Start: 2023-05-24 | End: 2023-05-24 | Stop reason: SDUPTHER

## 2023-05-24 RX ORDER — OXYCODONE HYDROCHLORIDE 5 MG/1
5 TABLET ORAL EVERY 4 HOURS PRN
Status: DISCONTINUED | OUTPATIENT
Start: 2023-05-24 | End: 2023-05-24 | Stop reason: HOSPADM

## 2023-05-24 RX ORDER — ASPIRIN 81 MG/1
81 TABLET ORAL EVERY 12 HOURS SCHEDULED
Status: DISCONTINUED | OUTPATIENT
Start: 2023-05-25 | End: 2023-05-24 | Stop reason: HOSPADM

## 2023-05-24 RX ORDER — PROPOFOL 10 MG/ML
INJECTION, EMULSION INTRAVENOUS AS NEEDED
Status: DISCONTINUED | OUTPATIENT
Start: 2023-05-24 | End: 2023-05-24

## 2023-05-24 RX ADMIN — BUPIVACAINE HYDROCHLORIDE 10 ML: 2.5 INJECTION, SOLUTION EPIDURAL; INFILTRATION; INTRACAUDAL at 09:30

## 2023-05-24 RX ADMIN — KETAMINE HYDROCHLORIDE 20 MG: 10 INJECTION INTRAMUSCULAR; INTRAVENOUS at 10:00

## 2023-05-24 RX ADMIN — DEXMEDETOMIDINE 10 MCG: 100 INJECTION, SOLUTION, CONCENTRATE INTRAVENOUS at 09:19

## 2023-05-24 RX ADMIN — FAMOTIDINE 20 MG: 10 INJECTION, SOLUTION INTRAVENOUS at 08:07

## 2023-05-24 RX ADMIN — PREGABALIN 150 MG: 75 CAPSULE ORAL at 08:06

## 2023-05-24 RX ADMIN — DEXMEDETOMIDINE 20 MCG: 100 INJECTION, SOLUTION, CONCENTRATE INTRAVENOUS at 09:13

## 2023-05-24 RX ADMIN — MIDAZOLAM HYDROCHLORIDE 1 MG: 1 INJECTION, SOLUTION INTRAMUSCULAR; INTRAVENOUS at 09:05

## 2023-05-24 RX ADMIN — SODIUM CHLORIDE, POTASSIUM CHLORIDE, SODIUM LACTATE AND CALCIUM CHLORIDE 9 ML/HR: 600; 310; 30; 20 INJECTION, SOLUTION INTRAVENOUS at 08:03

## 2023-05-24 RX ADMIN — TRANEXAMIC ACID 1000 MG: 10 INJECTION, SOLUTION INTRAVENOUS at 10:10

## 2023-05-24 RX ADMIN — MELOXICAM 15 MG: 7.5 TABLET ORAL at 08:06

## 2023-05-24 RX ADMIN — BUPIVACAINE HYDROCHLORIDE 15 ML: 2.5 INJECTION, SOLUTION EPIDURAL; INFILTRATION; INTRACAUDAL at 09:16

## 2023-05-24 RX ADMIN — VANCOMYCIN HYDROCHLORIDE 1250 MG: 1.25 INJECTION, POWDER, LYOPHILIZED, FOR SOLUTION INTRAVENOUS at 08:41

## 2023-05-24 RX ADMIN — BUPIVACAINE HYDROCHLORIDE 10 ML: 2.5 INJECTION, SOLUTION EPIDURAL; INFILTRATION; INTRACAUDAL; PERINEURAL at 09:13

## 2023-05-24 RX ADMIN — CEFAZOLIN SODIUM 2 G: 2 SOLUTION INTRAVENOUS at 10:08

## 2023-05-24 RX ADMIN — DEXAMETHASONE SODIUM PHOSPHATE 8 MG: 4 INJECTION, SOLUTION INTRAMUSCULAR; INTRAVENOUS at 08:07

## 2023-05-24 RX ADMIN — DEXMEDETOMIDINE 9 MCG: 100 INJECTION, SOLUTION, CONCENTRATE INTRAVENOUS at 10:37

## 2023-05-24 RX ADMIN — TRANEXAMIC ACID 1000 MG: 10 INJECTION, SOLUTION INTRAVENOUS at 11:40

## 2023-05-24 RX ADMIN — PHENYLEPHRINE HYDROCHLORIDE 100 MCG: 10 INJECTION INTRAVENOUS at 11:24

## 2023-05-24 RX ADMIN — BUPIVACAINE HYDROCHLORIDE 1.6 ML: 5 INJECTION, SOLUTION PERINEURAL at 09:52

## 2023-05-24 RX ADMIN — DEXMEDETOMIDINE 6 MCG: 100 INJECTION, SOLUTION, CONCENTRATE INTRAVENOUS at 10:50

## 2023-05-24 RX ADMIN — LIDOCAINE HYDROCHLORIDE 100 MG: 20 INJECTION, SOLUTION INFILTRATION; PERINEURAL at 10:04

## 2023-05-24 RX ADMIN — PROPOFOL 100 MCG/KG/MIN: 10 INJECTION, EMULSION INTRAVENOUS at 10:04

## 2023-05-24 RX ADMIN — ONDANSETRON 4 MG: 2 INJECTION INTRAMUSCULAR; INTRAVENOUS at 08:07

## 2023-05-24 NOTE — ANESTHESIA POSTPROCEDURE EVALUATION
Patient: Sharonda Preciado    Procedure Summary     Date: 05/24/23 Room / Location:  [facility] /  [facility]    Anesthesia Start: 0958 Anesthesia Stop: 1220    Procedure: TOTAL KNEE ARTHROPLASTY WITH CORI ROBOT (Left: Knee) Diagnosis:       Primary osteoarthritis of left knee      (Primary osteoarthritis of left knee [M17.12])    Surgeons: Jeremy Meneses MD Provider: Kary Kraft CRNA    Anesthesia Type: regional, spinal, MAC ASA Status: 3          Anesthesia Type: regional, spinal, MAC    Vitals  No vitals data found for the desired time range.          Post Anesthesia Care and Evaluation    Patient location during evaluation: bedside  Patient participation: complete - patient participated  Level of consciousness: awake and alert  Pain score: 0  Pain management: adequate    Airway patency: patent  Anesthetic complications: No anesthetic complications  PONV Status: none  Cardiovascular status: acceptable  Respiratory status: acceptable  Hydration status: acceptable  Post Neuraxial Block status: Motor and sensory function returned to baseline and No signs or symptoms of PDPH

## 2023-05-24 NOTE — ANESTHESIA PROCEDURE NOTES
Peripheral Block    Pre-sedation assessment completed: 5/24/2023 9:05 AM    Patient reassessed immediately prior to procedure    Patient location during procedure: pre-op  Start time: 5/24/2023 9:22 AM  Stop time: 5/24/2023 9:30 AM  Reason for block: at surgeon's request and post-op pain management  Performed by  CRNA/CAA: Mansoor Mendieta CRNA  Assisted by: LEON Stringer CRNA  Preanesthetic Checklist  Completed: patient identified, IV checked, site marked, risks and benefits discussed, surgical consent, monitors and equipment checked, pre-op evaluation and timeout performed  Prep:  Pt Position: supine  Sterile barriers:cap, gloves, mask and washed/disinfected hands  Prep: ChloraPrep  Patient monitoring: blood pressure monitoring, continuous pulse oximetry and EKG  Procedure    Sedation: yes  Performed under: local infiltration  Guidance:ultrasound guided    ULTRASOUND INTERPRETATION.  Using ultrasound guidance a 21 G gauge needle was placed in close proximity to the nerve, at which point, under ultrasound guidance anesthetic was injected in the area of the nerve and spread of the anesthesia was seen on ultrasound in close proximity thereto.  There were no abnormalities seen on ultrasound; a digital image was taken; and the patient tolerated the procedure with no complications. Images:still images obtained, printed/placed on chart    Block Type:NICHOLAS  Injection Technique:single-shot  Needle Type:echogenic  Needle Gauge:21 G  Resistance on Injection: none    Medications Used: bupivacaine PF (MARCAINE) injection 0.25% - Injection   10 mL - 5/24/2023 9:30:00 AM      Post Assessment  Injection Assessment: negative aspiration for heme, no paresthesia on injection and incremental injection  Patient Tolerance:comfortable throughout block  Complications:no

## 2023-05-24 NOTE — ANESTHESIA PROCEDURE NOTES
Peripheral Block    Pre-sedation assessment completed: 5/24/2023 9:05 AM    Patient reassessed immediately prior to procedure    Patient location during procedure: pre-op  Start time: 5/24/2023 9:11 AM  Stop time: 5/24/2023 9:13 AM  Reason for block: procedure for pain, at surgeon's request and post-op pain management  Performed by  CRNA/CAA: Mansoor Mendieta CRNA  Assisted by: LEON Stringer CRNA  Preanesthetic Checklist  Completed: patient identified, IV checked, site marked, risks and benefits discussed, surgical consent, monitors and equipment checked, pre-op evaluation and timeout performed  Prep:  Pt Position: supine  Sterile barriers:cap, gloves, mask and washed/disinfected hands  Prep: ChloraPrep  Patient monitoring: blood pressure monitoring, continuous pulse oximetry and EKG  Procedure    Sedation: yes  Performed under: local infiltration  Guidance:ultrasound guided    ULTRASOUND INTERPRETATION.  Using ultrasound guidance a 21 G gauge needle was placed in close proximity to the nerve, at which point, under ultrasound guidance anesthetic was injected in the area of the nerve and spread of the anesthesia was seen on ultrasound in close proximity thereto.  There were no abnormalities seen on ultrasound; a digital image was taken; and the patient tolerated the procedure with no complications. Images:still images obtained, printed/placed on chart    Laterality:left  Block Type:adductor canal block  Injection Technique:single-shot  Needle Type:echogenic  Needle Gauge:21 G  Resistance on Injection: none    Medications Used: bupivacaine PF (MARCAINE) injection 0.25% - Injection   10 mL - 5/24/2023 9:13:00 AM      Medications  Comment:Added 20 mcg precedex to block    Post Assessment  Injection Assessment: negative aspiration for heme, no paresthesia on injection and incremental injection  Patient Tolerance:comfortable throughout block  Complications:no

## 2023-05-24 NOTE — ANESTHESIA PROCEDURE NOTES
Spinal Block    Pre-sedation assessment completed: 5/24/2023 9:48 AM    Patient reassessed immediately prior to procedure    Patient location during procedure: pre-op  Start Time: 5/24/2023 9:50 AM  Stop Time: 5/24/2023 9:52 AM  Indication:at surgeon's request and post-op pain management  Performed By  CRNA/CAA: Mansoor Mendieta CRNA  Preanesthetic Checklist  Completed: patient identified, IV checked, site marked, risks and benefits discussed, surgical consent, monitors and equipment checked, pre-op evaluation and timeout performed  Spinal Block Prep:  Patient Position:sitting  Sterile Tech:cap, gloves, mask and sterile barriers  Prep:Chloraprep  Patient Monitoring:blood pressure monitoring, continuous pulse oximetry and EKG    Spinal Block Procedure  Approach:midline  Guidance:landmark technique and palpation technique  Location:L3-L4  Needle Type:Sprotte  Needle Gauge:25 G  Placement of Spinal needle event:cerebrospinal fluid aspirated  Paresthesia: no  Fluid Appearance:clear  Medications: bupivacaine (MARCAINE) injection 0.5% - Injection   1.6 mL - 5/24/2023 9:52:00 AM   Post Assessment  Patient Tolerance:patient tolerated the procedure well with no apparent complications  Complications no

## 2023-05-24 NOTE — ANESTHESIA PROCEDURE NOTES
Peripheral Block    Pre-sedation assessment completed: 5/24/2023 9:05 AM    Patient reassessed immediately prior to procedure    Patient location during procedure: pre-op  Start time: 5/24/2023 9:16 AM  Stop time: 5/24/2023 9:19 AM  Reason for block: at surgeon's request and post-op pain management  Performed by  CRNA/CAA: Mansoor Mendieta CRNA  Assisted by: LEON Stringer CRNA  Preanesthetic Checklist  Completed: patient identified, IV checked, site marked, risks and benefits discussed, surgical consent, monitors and equipment checked, pre-op evaluation and timeout performed  Prep:  Pt Position: supine  Sterile barriers:cap, gloves, mask and washed/disinfected hands  Prep: ChloraPrep  Patient monitoring: blood pressure monitoring, continuous pulse oximetry and EKG  Procedure    Sedation: yes  Performed under: local infiltration  Guidance:ultrasound guided    ULTRASOUND INTERPRETATION.  Using ultrasound guidance a 21 G gauge needle was placed in close proximity to the nerve, at which point, under ultrasound guidance anesthetic was injected in the area of the nerve and spread of the anesthesia was seen on ultrasound in close proximity thereto.  There were no abnormalities seen on ultrasound; a digital image was taken; and the patient tolerated the procedure with no complications. Images:still images obtained, printed/placed on chart    Block Type:iPack  Injection Technique:single-shot  Needle Type:echogenic  Needle Gauge:21 G  Resistance on Injection: none    Medications Used: bupivacaine PF (MARCAINE) injection 0.25% - Injection   15 mL - 5/24/2023 9:16:00 AM      Medications  Comment:Added 10 mcg Precedex to block    Post Assessment  Injection Assessment: negative aspiration for heme, no paresthesia on injection and incremental injection  Patient Tolerance:comfortable throughout block  Complications:no

## 2023-05-24 NOTE — DISCHARGE INSTRUCTIONS
Total Knee Joint Replacement Discharge Instructions:    I. ACTIVITIES:  1. Exercises:  Complete exercise program as taught by the hospital physical therapist 2 times per day  Exercise program will be advanced by the physical therapist  During the day be up ambulating every 2 hours (while awake) for short distances  Complete the ankle pump exercises at least 10 times per hour (while awake)  Elevate legs most of the day the first week post operatively and thereafter elevate legs when in bed and for at least 30 minutes during the day. Caution must be taken to avoid pillow placement under the bend of the knee as this can led to flexion contractures of the knee.  Use cold packs 20-30 minutes approximately 5 times per day. This should be done before and after completing your exercises and at any time you are experiencing pain/ stiffness in your operative extremity.      2. Activities of Daily Living:  No tub baths, hot tubs, or swimming pools for 4 weeks  May shower and let water run over the incision on post-operative day #5 if no drainage. Do not scrub or rub the incision. Simply let the water run over the incision and pat dry.    II. Restrictions  Do not cross legs or kneel  Your surgeon will discuss with you when you will be able to drive again.  Weight bearing is as tolerated  First week stay inside on even terrain. May go up and down stairs one stair at a time utilizing the hand rail  After one week, you may venture outside.    III. Precautions:  Everyone that comes near you should wash their hands  No elective dental, genital-urinary, or colon procedures or surgical procedures for 12 weeks after surgery unless absolutely necessary.   If dental work or surgical procedure is deemed absolutely necessary, you will need to contact your surgeon as you will need to take antibiotics 1 hour prior to any dental work (including teeth cleanings).  Please discuss with your surgeon prophylactic antibiotics as the length of time  this intervention will be necessary for you varies with each patient’s health history and situation.  Avoid sick people. If you must be around someone who is ill, they should wear a mask.  Avoid visits to the Emergency Room or Urgent Care unless you are having a life threatening event.   If ordered stockings are to be placed on in the morning and removed at night. Monitor the stockings to ensure that any swelling is not causing the stockings to become too tight. In this case, remove stockings immediately.    IV. INCISION CARE:  May remove the Ace wrap 2 days following surgery  The negative pressure dressing with the battery pack is waterproof and should remain in place for 7 days.  You may shower with the negative pressure dressing as it is waterproof  Following removal of the dressing on day 7 you may shower and allow water to run over the incision  No submersion of incision in water such as tubs pools hot tubs etc.  No creams or ointments to the incision  Do not touch or pick at the incision  Check incision/dressing every day and notify surgeon immediately if any of the following signs or symptoms are noted:  Increase in redness  Increase in swelling around the incision and of the entire extremity  Increase in pain  Drainage oozing from the incision  Pulling apart of the edges of the incision  Increase in overall body temperature (greater than 100.5 degrees)  Your surgeon will instruct you regarding suture or staple removal    V. Medications:   1. Anticoagulants: You will be discharged on an anticoagulant. This is a prophylactic medication that helps prevent blood clots during your post-operative period. The type and length of dosage varies based on your individual needs, procedure performed, and surgeon’s preference.  While taking the anticoagulant, you should avoid taking any additional aspirin, ibuprofen (Advil or Motrin), Aleve (Naprosyn) or other non-steroidal anti-inflammatory medications.   Notify surgeon  immediately if any bartolo bleeding is noted in the urine, stool, emesis, or from the nose or the incision. Blood in the stool will often appear as black rather than red. Blood in urine may appear as pink. Blood in emesis may appear as brown/black like coffee grounds.  You will need to apply pressure for longer periods of time to any cuts or abrasions to stop bleeding  Avoid alcohol while taking anticoagulants    2. Stool Softeners: You will be at greater risk of constipation after surgery due to being less mobile and the pain medications.   Take stool softeners as instructed by your surgeon while on pain medications. Over the counter Colace 100 mg 1-2 capsules twice daily.   If stools become too loose or too frequent, please decreases the dosage or stop the stool softener.  If constipation occurs despite use of stool softeners, you are to continue the stool softeners and add a laxative (Milk of Magnesia 1 ounce daily as needed)  Drink plenty of fluids, and eat fruits and vegetables during your recovery time    3. Pain Medications utilized after surgery are narcotics and the law requires that the following information be given to all patients that are prescribed narcotics:  CLASSIFICATION: Pain medications are called Opioids and are narcotics  LEGALITIES: It is illegal to share narcotics with others and to drive within 24 hours of taking narcotics  POTENTIAL SIDE EFFECTS: Potential side effects of opioids include: nausea, vomiting, itching, dizziness, drowsiness, dry mouth, constipation, and difficulty urinating.  POTENTIAL ADVERSE EFFECTS:   Opioid tolerance can develop with use of pain medications and this simply means that it requires more and more of the medication to control pain; however, this is seen more in patients that use opioids for longer periods of time.  Opioid dependence can develop with use of Opioids and this simply means that to stop the medication can cause withdrawal symptoms; however, this is  seen with patients that use Opioids for longer periods of time.  Opioid addiction can develop with use of Opioids and the incidence of this is very unlikely in patients who take the medications as ordered and stop the medications as instructed.  Opioid overdose can be dangerous, but is unlikely when the medication is taken as ordered and stopped when ordered. It is important not to mix opioids with alcohol or with and type of sedative such as Benadryl as this can lead to over sedation and respiratory difficulty.  DOSAGE:   Pain medications will need to be taken consistently for the first week to decrease pain and promote adequate pain relief and participation in physical therapy.  After the initial surgical pain begins to resolve, you may begin to decrease the pain medication. By the end of 6 weeks, you should be off of pain medications.  Refills will not be given by the office during evening hours, on weekends, or after 6 weeks post-op.  To seek refills on pain medications during the initial 6 week post-operative period, you must call the office 48 hours in advance to request the refill. The office will then notify you when to  the prescription. DO NOT wait until you are out of the medication to request a refill.    V. FOLLOW-UP VISITS:  You will need to follow up in the office with your surgeon in 2 weeks. Please call this number [phone] to schedule this appointment.  If you have any concerns or suspected complications prior to your follow up visit, please call your surgeons office. Do not wait until your appointment time if you suspect complications. These will need to be addressed in the office promptly.

## 2023-05-24 NOTE — INTERVAL H&P NOTE
H&P updated. The patient was examined and would like to be discharged home following surgery today orders will be changed from 23-hour observation to hospital outpatient surgery.

## 2023-05-24 NOTE — OP NOTE
OPERATIVE REPORT    Date of Surgery:   05/24/23    Attending Surgeon:   Jeremy eMneses MD, MSE    ASSISTANTS:  Assistant: (Jose Rolle) was responsible for performing the following activities: Retraction, Suction, Closing and Placing Dressing and their skilled assistance was necessary for the success of this case.     PREOPERATIVE DIAGNOSIS:   Left knee osteoarthritis    POSTOPERATIVE DIAGNOSIS:   Same as above     PROCEDURE:   1. Left primary total knee arthroplasty, with robotic assistance (CORI)    Surgical Approach:        IMPLANTS:   : [employer]  Brand: Legion TKS  Tibial component size: 3  Femoral component size: 4  Patellar Button: none  Bearing type: CR  Insert Thickness: 11 mm    SURGICAL DETAILS:  Preoperative Passive Range of Motion: 6 to 132 degrees  Postoperative Passive Range of Motion: 5 to 134 degrees  Pre Op 7 degrees Varus,  Post Op 0 degrees Varus   Incision/arthrotomy type: Medial parapatellar  Posterior Cruciate Ligament: Retained  Lateral release: No  Estimated blood loss: 150 cc  Anesthesia type: Spinal and Regional  Tourniquet: Yes: 250 mmHG 6 mins    INDICATIONS FOR PROCEDURE:  This patient presents today with end stage degenerative joint disease of the knee. The patient has failed non-operative treatment and presents for total knee replacement. Risks, complications, and benefits of the procedure have been discussed and all questions have been answered preoperatively.    PROCEDURE:  The patient was brought to the operating room and placed on the operating room table in the supine position. After anesthesia was established, the patient was positioned supine. Bony prominences were padded. The patient was given prophylactic antibiotics within one hour of skin incision. The involved lower extremity was prepped and draped in usual sterile fashion.  Surgical timeout was taken to confirm the operative side and planned procedure.    A straight incision was used medial to the  midline carried through the subcutaneous tissue to the underlying extensor mechanism. A medial parapatellar approach was utilized. The tibial and femoral reference arrays were assembled first. 2 drill tip threaded pins were placed medial to the tibial tubercle within the surgical field. The pins were inserted under power using the drill guide included in the CORI set for their relative positions. Similarly, the 2 femoral pins were placed medially in the femur just proxmial to the medial epicondyle. The sensor arrays were assembled to the pins    The patella was treated with a lateral facetectomy. It was everted, a lateral facetectomy was performed, marginal osteophytes trimmed,  and the synovial margin was cauterized, clearing excess synovium.    The ACL was resected. Osteophytes were removed from the tibia and femur. A small medial capsular peel was performed.    The hip center was registered. The medial and lateral malleoli were registered. The femur and tibia were registered.    The knee motion and balance was assessed with standard poses. Pre-operative cut planning was adjusted to provide knee balance throughout the range of motion    We then turned out attention to the distal femur and using the CORI bur the distal femur resection was performed.  2 bur holes were then placed and the distal femoral punch was introduced for the  holes for the 4 in 1 femoral cutting block.     We then turned to the tibia and using the probe with a flat disc attached to it the tibial cutting block was floated into place using robotic navication. It was held in place with 2 a to p pins and one cross pin.  Depth resection, varus/valgus, and tibial slope were again confirmed. We then performed our tibial resection with a Z retractor medially, bent homman laterally, and PCL retractor posteriorly.  The tibia resection was then removed.    Attention was then turned back to the femur and the appropriately sized 4 in 1 femoral cutting  block was malleted into place and secured with 2 convergent suck down pins.     A laminar  was placed and the medial and lateral menisci were excised. Posterior femoral osteophytes were removed with an osteotome. The bovie was used to cauterize the posterior knee capsule and meniscal remnants.     The tibial surface was then sized using the trial baseplate and secured with 2 pins.  Careful attention was taken to ensure it was aligned with the medial third of the tibial tubercle. The trial femur was then impacted into place and the appropriate sized trail poly was inserted.  Knee motion and balance were checked manually and with the robotic assistance. The patella was not resurfaced. The patella tracked well. The femoral lug drills were performed. The trial femoral components and poly were removed. The tracker pins and arrays were removed.    Attention was then turned to the tibial prep.  The keel drill and punch introduced.     The trial tibial implant was removed. All bone was then prepared with lavage and drying for preparation prior to final component placement. The final tibial component was cemented into position and excess cement was removed. The final femoral component was cemented into position and excess cement was removed. . The trial poly was placed. After cement curing, motion and stability was again tested. The final tibial insert was exchanged to the final tibial insert which was impacted into position.  Surgical site was irrigated with dilute Betadine solution and then normal saline with pulsatile lavage.  The medial and lateral capsular flaps as well as tibial and femoral periosteum was injected with a mixture consisting of 30 mL of 0.5% ropivacaine, 30 mg of ketorolac, and epinephrine 0.2 mg solution diluted in 30 mL of normal saline.    The arthrotomy was closed with #1 PDS stratafix. The subcutaneous tissue was closed with 2-0 monocryl. The skin was closed with running 3-0 monocryl  stratafix and dermabond a sterile FARHAD dressing was placed.      The patient tolerated the procedure well and was taken to the recovery room in stable condition.    POSTOPERATIVE PLAN:   1. Weightbearing as tolerated on operative extremity  2. DVT prophylaxis    WOUND CLOSURE:  #1 PDS Stratfix  2-0 monocryl subcuticular   3-0 monocryl stratafix skin  dermabond  FARHAD dressing      SPONGE/INSTRUMENT/NEEDLE COUNTS:   Correct x 2    CONDITION ON DISCHARGE:   Stable    COMPLICATIONS:   None    Jeremy Meneses MD, MSE

## 2023-05-24 NOTE — ANESTHESIA PREPROCEDURE EVALUATION
Anesthesia Evaluation     Patient summary reviewed and Nursing notes reviewed   NPO Solid Status: > 8 hours  NPO Liquid Status: > 2 hours           Airway   Mallampati: II  TM distance: >3 FB  Neck ROM: full  No difficulty expected  Dental - normal exam     Pulmonary - normal exam   (+) sleep apnea on CPAP,   Cardiovascular - normal exam  Exercise tolerance: good (4-7 METS)    ECG reviewed  Rhythm: regular  Rate: normal    (+) hypertension well controlled,     ROS comment: 3/9/23: EKG    HEART RATE= 81  bpm  RR Interval= 740  ms  AK Interval= 175  ms  P Horizontal Axis= -5  deg  P Front Axis= 62  deg  QRSD Interval= 103  ms  QT Interval= 388  ms  QRS Axis= 38  deg  T Wave Axis= 37  deg  - NORMAL ECG -  Sinus rhythmHEART RATE= 81  bpm  RR Interval= 740  ms  AK Interval= 175  ms  P Horizontal Axis= -5  deg  P Front Axis= 62  deg  QRSD Interval= 103  ms  QT Interval= 388  ms  QRS Axis= 38  deg  T Wave Axis= 37  deg  - NORMAL ECG -  Sinus rhythm    Neuro/Psych  (+) psychiatric history Anxiety and Depression,    GI/Hepatic/Renal/Endo    (+) obesity,   thyroid problem hypothyroidism    Musculoskeletal     Abdominal   (+) obese,     Abdomen: soft.   Substance History - negative use     OB/GYN negative ob/gyn ROS         Other   arthritis, autoimmune disease (not on blood thinners; had clots in uterine artery during pregnancy many years ago) lupus,                      Anesthesia Plan    ASA 3     regional, spinal and MAC   total IV anesthesia  intravenous induction     Anesthetic plan, risks, benefits, and alternatives have been provided, discussed and informed consent has been obtained with: patient.  Pre-procedure education provided  Use of blood products discussed with patient  Consented to blood products.       CODE STATUS:

## 2023-05-24 NOTE — PLAN OF CARE
Goal Outcome Evaluation:  Plan of Care Reviewed With: patient           Outcome Evaluation: PT Evaluation Complete: Patient performs supine to/from sit transfers with supervision, sit to/from stand transfers with SBA, and gait x 200 feet with CGA with use of FWW. Patient manages device safely, no loss of balance or knee buckling noted. Patient has no concerns for return home and plans to follow up with outpatient PT on 5/26. No further inpatient skilled PT needs at this time. Anticipate discharge home today.

## 2023-05-24 NOTE — THERAPY DISCHARGE NOTE
Patient Name: Sharonda Preciado  : 1965    MRN: [id number]                              Today's Date: 2023       Admit Date: 2023    Visit Dx:     ICD-10-CM ICD-9-CM   1. Primary osteoarthritis of left knee  M17.12 715.16     Patient Active Problem List   Diagnosis   • Depression   • Edema   • Fatigue   • Fibromyalgia   • Hypertension   • Hypothyroidism   • Insomnia   • Knee pain   • Auditory vertigo   • Adiposity   • Vitamin D deficiency   • Acute URI   • Primary osteoarthritis of right knee   • Primary osteoarthritis of left knee     Past Medical History:   Diagnosis Date   • Anemia    • Anticardiolipin antibody positive    • Antiphospholipid antibody positive    • Arthritis    • Hypertension    • Hypothyroid    • Knee swelling    • Sleep apnea with use of continuous positive airway pressure (CPAP)      Past Surgical History:   Procedure Laterality Date   •  SECTION     • COLONOSCOPY     • DILATATION AND CURETTAGE     • KNEE SURGERY     • TOTAL KNEE ARTHROPLASTY Right 03/15/2023    Procedure: TOTAL KNEE ARTHROPLASTY WITH CORI ROBOT;  Surgeon: Jeremy Meneses MD;  Location: [facility];  Service: Robotics - Ortho;  Laterality: Right;  RIGHT TOTAL KNEE ARTHROPLASTY WITH CORI ROBOT      General Information     Row Name 23 1410          Physical Therapy Time and Intention    Document Type discharge evaluation/summary  -BP     Mode of Treatment physical therapy  -BP     Row Name 23 1410          General Information    Patient Profile Reviewed yes  Patient s/p L TKA.  -BP     Prior Level of Function independent:;all household mobility;community mobility;gait;transfer;ADL's;driving;bed mobility  without use of an AD.  -BP     Existing Precautions/Restrictions fall  -BP     Barriers to Rehab none identified  -BP     Row Name 23 1410          Living Environment    People in Home alone  -BP     Row Name 23 1410          Home Main Entrance    Number of Stairs, Main Entrance  none  -BP     Row Name 05/24/23 1410          Stairs Within Home, Primary    Stairs, Within Home, Primary first floor apartement  -BP     Row Name 05/24/23 1410          Cognition    Orientation Status (Cognition) oriented x 4  -BP     Row Name 05/24/23 1410          Safety Issues, Functional Mobility    Comment, Safety Issues/Impairments (Mobility) WFL  -BP           User Key  (r) = Recorded By, (t) = Taken By, (c) = Cosigned By    Initials Name Provider Type    Colin Sepulveda PT Physical Therapist               Mobility     Row Name 05/24/23 1411          Bed Mobility    Bed Mobility supine-sit;sit-supine  -BP     Supine-Sit [place] (Bed Mobility) supervision  -BP     Sit-Supine [place] (Bed Mobility) supervision  -BP     Assistive Device (Bed Mobility) head of bed elevated  -BP     Row Name 05/24/23 1411          Transfers    Comment, (Transfers) verbal cues for hand placement  -BP     Row Name 05/24/23 1411          Sit-Stand Transfer    Sit-Stand [place] (Transfers) standby assist;verbal cues  -BP     Assistive Device (Sit-Stand Transfers) walker, front-wheeled  -BP     Row Name 05/24/23 1411          Gait/Stairs (Locomotion)    [place] Level (Gait) contact guard;verbal cues  -BP     Assistive Device (Gait) walker, front-wheeled  -BP     Ambulated day of surgery or within 4 hours of PACU discharge yes  -BP     Distance in Feet (Gait) 200  -BP     Deviations/Abnormal Patterns (Gait) stride length decreased  -BP     Comment, (Gait/Stairs) Patient demonstrates good gait speed, no loss of balance noted. No knee buckling noted.  -BP           User Key  (r) = Recorded By, (t) = Taken By, (c) = Cosigned By    Initials Name Provider Type    Colin Sepulveda PT Physical Therapist               Obj/Interventions     Row Name 05/24/23 1412          Range of Motion Comprehensive    Comment, General Range of Motion R LE AROM. L hip and ankle AROM WFL. L knee not formerly assessed howver noted  full extension.  -BP     Row Name 05/24/23 1412          Strength Comprehensive (MMT)    Comment, General Manual Muscle Testing (MMT) Assessment R LE strength functional. L LE strength not assessed however patient able to perform L SLR and LAQ fully without need for assist.  -     Row Name 05/24/23 1412          Balance    Comment, Balance static sitting balance-supervision. Standing balance-SBA with device  -     Row Name 05/24/23 1412          Sensory Assessment (Somatosensory)    Sensory Assessment (Somatosensory) sensation intact  -           User Key  (r) = Recorded By, (t) = Taken By, (c) = Cosigned By    Initials Name Provider Type    BP Colin Monson, PT Physical Therapist               Goals/Plan    No documentation.                Clinical Impression     [facility] Name 05/24/23 1414          Pain    Pretreatment Pain Rating 0/10 - no pain  -BP     Posttreatment Pain Rating 0/10 - no pain  -BP     Additional Documentation Pain Scale: Numbers Pre/Post-Treatment (Group)  -[facility] Name 05/24/23 1414          Plan of Care Review    Plan of Care Reviewed With patient  -BP     Outcome Evaluation PT Evaluation Complete: Patient performs supine to/from sit transfers with supervision, sit to/from stand transfers with SBA, and gait x 200 feet with CGA with use of FWW. Patient manages device safely, no loss of balance or knee buckling noted. Patient has no concerns for return home and plans to follow up with outpatient PT on 5/26. No further inpatient skilled PT needs at this time. Anticipate discharge home today.  -     Row Name 05/24/23 1414          Therapy Assessment/Plan (PT)    Criteria for Skilled Interventions Met (PT) no problems identified which require skilled intervention  -BP     Therapy Frequency (PT) evaluation only  -     Row Name 05/24/23 1414          Positioning and Restraints    Pre-Treatment Position in bed  -BP     Post Treatment Position bed  -BP     In Bed notified nsg;supine;call  light within reach;encouraged to call for assist  -BP           User Key  (r) = Recorded By, (t) = Taken By, (c) = Cosigned By    Initials Name Provider Type    BP Colin Monson PT Physical Therapist               Outcome Measures     Row Name 05/24/23 1417          How much help from another person do you currently need...    Turning from your back to your side while in flat bed without using bedrails? 4  -BP     Moving from lying on back to sitting on the side of a flat bed without bedrails? 3  -BP     Moving to and from a bed to a chair (including a wheelchair)? 3  -BP     Standing up from a chair using your arms (e.g., wheelchair, bedside chair)? 3  -BP     Climbing 3-5 steps with a railing? 3  -BP     To walk in hospital room? 3  -BP     AM-PAC 6 Clicks Score (PT) 19  -BP     Highest level of mobility 6 --> Walked 10 steps or more  -BP     Row Name 05/24/23 1417          Functional Assessment    Outcome Measure Options AM-PAC 6 Clicks Basic Mobility (PT)  -BP           User Key  (r) = Recorded By, (t) = Taken By, (c) = Cosigned By    Initials Name Provider Type    BP Colin Monson PT Physical Therapist              Physical Therapy Education     Title: PT OT SLP Therapies (Resolved)     Topic: Physical Therapy (Resolved)     Point: Mobility training (Resolved)     Learning Progress Summary           Patient Acceptance, E,TB, VU by BP at 5/24/2023 1418                   Point: Home exercise program (Resolved)     Learning Progress Summary           Patient Acceptance, E,TB, VU by BP at 5/24/2023 1418                               User Key     Initials Effective Dates Name Provider Type Discipline    BP 06/16/21 -  Colin Monson PT Physical Therapist PT              PT Recommendation and Plan     Plan of Care Reviewed With: patient  Outcome Evaluation: PT Evaluation Complete: Patient performs supine to/from sit transfers with supervision, sit to/from stand transfers with SBA, and gait x 200 feet  with CGA with use of FWW. Patient manages device safely, no loss of balance or knee buckling noted. Patient has no concerns for return home and plans to follow up with outpatient PT on 5/26. No further inpatient skilled PT needs at this time. Anticipate discharge home today.     Time Calculation:    PT Charges     Row Name 05/24/23 1419             Time Calculation    Start Time 1315  -BP      Stop Time 1330  -BP      Time Calculation (min) 15 min  -BP      PT Received On 05/24/23  -BP            User Key  (r) = Recorded By, (t) = Taken By, (c) = Cosigned By    Initials Name Provider Type    BP Colin Monson, PT Physical Therapist              Therapy Charges for Today     Code Description Service Date Service Provider Modifiers Qty    60875557589 HC PT EVAL LOW COMPLEXITY 1 5/24/2023 Colin Monson, PT GP 1          PT G-Codes  Outcome Measure Options: AM-PAC 6 Clicks Basic Mobility (PT)  AM-PAC 6 Clicks Score (PT): 19    PT Discharge Summary  Anticipated Discharge Disposition (PT): home with outpatient therapy services  Reason for Discharge: Discharge from facility  Discharge Destination: Home with outpatient services    Colin Monson, PT  5/24/2023

## 2023-05-25 ENCOUNTER — READMISSION MANAGEMENT (OUTPATIENT)
Dept: CALL CENTER | Facility: HOSPITAL | Age: 58
End: 2023-05-25
Payer: COMMERCIAL

## 2023-05-25 NOTE — OUTREACH NOTE
Prep Survey      Flowsheet Row Responses   [facility] facility patient discharged from? [place]   Is LACE score < 7 ? Yes   Eligibility Readm Mgmt   Discharge diagnosis primary osteoarthritis of left knee,  total knee   Does the patient have one of the following disease processes/diagnoses(primary or secondary)? Other   Does the patient have Home health ordered? No   Is there a DME ordered? No   Prep survey completed? Yes            Barbara DASILVA - Registered Nurse

## 2023-05-26 ENCOUNTER — READMISSION MANAGEMENT (OUTPATIENT)
Dept: CALL CENTER | Facility: HOSPITAL | Age: 58
End: 2023-05-26
Payer: COMMERCIAL

## 2023-05-26 ENCOUNTER — TELEPHONE (OUTPATIENT)
Dept: ORTHOPEDIC SURGERY | Facility: CLINIC | Age: 58
End: 2023-05-26
Payer: COMMERCIAL

## 2023-05-26 ENCOUNTER — TREATMENT (OUTPATIENT)
Dept: PHYSICAL THERAPY | Facility: CLINIC | Age: 58
End: 2023-05-26
Payer: COMMERCIAL

## 2023-05-26 DIAGNOSIS — Z96.652 AFTERCARE FOLLOWING LEFT KNEE JOINT REPLACEMENT SURGERY: Primary | ICD-10-CM

## 2023-05-26 DIAGNOSIS — Z96.652 S/P TKR (TOTAL KNEE REPLACEMENT), LEFT: Primary | ICD-10-CM

## 2023-05-26 DIAGNOSIS — R26.2 DIFFICULTY WALKING: ICD-10-CM

## 2023-05-26 DIAGNOSIS — G89.29 CHRONIC PAIN OF LEFT KNEE: ICD-10-CM

## 2023-05-26 DIAGNOSIS — Z47.1 AFTERCARE FOLLOWING LEFT KNEE JOINT REPLACEMENT SURGERY: Primary | ICD-10-CM

## 2023-05-26 DIAGNOSIS — M25.562 CHRONIC PAIN OF LEFT KNEE: ICD-10-CM

## 2023-05-26 NOTE — OUTREACH NOTE
LAG < 7 Survey    Flowsheet Row Responses   [facility] patient discharged from? [facility]   Does the patient have one of the following disease processes/diagnoses(primary or secondary)? Other   BHLAG <7 Attempt successful? Yes   Call start time 1244   Call end time 1246   Discharge diagnosis primary osteoarthritis of left knee,  total knee   Person spoke with today (if not patient) and relationship Daughter- Iza Giles reviewed with patient/caregiver? Yes   Is the patient taking all medications as directed (includes completed medication regime)? Yes   Does the patient have a primary care provider?  Yes   Comments regarding PCP has post op appt scheduled.   Has home health visited the patient within 72 hours of discharge? N/A   Psychosocial issues? No   Did the patient receive a copy of their discharge instructions? Yes   Nursing interventions Reviewed instructions with patient   What is the patient's perception of their health status since discharge? Improving   Is the patient/caregiver able to teach back signs and symptoms related to disease process for when to call PCP? Yes   Is the patient/caregiver able to teach back signs and symptoms related to disease process for when to call 911? Yes   Is the patient/caregiver able to teach back the hierarchy of who to call/visit for symptoms/problems? PCP, Specialist, Home health nurse, Urgent Care, ED, 911 Yes   Graduated Yes   Wrap up additional comments Daughter states her mother is doing very well. Going to OP PT, no s/s of infection noted. no concerns or questions.          Odette MCGOVERN - Registered Nurse

## 2023-05-26 NOTE — PROGRESS NOTES
Physical Therapy Initial Evaluation and Plan of Care    [facility]  [address] [place] [address]  [phone] (phone)  [phone] (fax)    Patient: Sharonda Preciado   : 1965  Diagnosis/ICD-10 Code:  Aftercare following left knee joint replacement surgery [Z47.1, Z96.652]  Referring practitioner: Jeremy Meneses MD  Date of Initial Visit: 2023  Today's Date: 2023  Patient seen for 1 sessions           Subjective Evaluation    History of Present Illness  Onset date: Chronic.  Date of surgery: 2023  Mechanism of injury: Patient is s/p L TKA on 23. Patient underwent R TKA on March 15, 2023 which she has recovered well from.    Patient was told the arthritis was more severe in the L knee (compared to what her R knee looked like). Since surgery she has been dealing with knee stiffness and severe pain. She has relied more on pain medication this time (compared to her other TKA).     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) Quality of life: good    Pain  Current pain ratin  At best pain ratin  At worst pain ratin  Location: L knee  Quality: radiating, pressure, tight, throbbing, knife-like and discomfort  Relieving factors: ice, change in position, medications, relaxation, rest and support  Aggravating factors: ambulation, stairs, lifting, standing, squatting and repetitive movement  Progression: worsening    Social Support  Lives in: apartment  Lives with: alone (Ex  staying right now, daughter helping out too )    Diagnostic Tests  X-ray: abnormal    Treatments  Previous treatment: physical therapy, injection treatment and immobilization  Patient Goals  Patient goals for therapy: decreased edema, decreased pain, increased motion, return to sport/leisure activities and increased strength             Objective          Observations   Left Knee   Positive for  drainage, edema and incision.       Palpation   Left   Tenderness of the rectus femoris, vastus lateralis and vastus medialis.     Tenderness   Left Knee   Tenderness in the inferior patella, ITB, lateral joint line, lateral patella, medial joint line, medial patella, patellar tendon, quadriceps tendon and superior patella.     Neurological Testing     Sensation     Knee   Left Knee   Intact: light touch    Right Knee   Intact: light touch     Active Range of Motion   Left Knee   Flexion: 75 degrees   Extensor lag: 10 degrees     Right Knee   Flexion: 122 degrees WFL  Extension: 0 degrees     Passive Range of Motion   Left Knee   Flexion: 85 degrees     Strength/Myotome Testing     Left Hip   Planes of Motion   Flexion: 2+    Right Hip   Planes of Motion   Flexion: 4+    Left Knee   Flexion: 2  Extension: 2-    Right Knee   Flexion: 4+  Extension: 4+    Left Ankle/Foot   Dorsiflexion: 4+    Right Ankle/Foot   Dorsiflexion: 4+    Swelling     Left Knee Girth Measurement (cm)   Joint line: 47 cm    Right Knee Girth Measurement (cm)   Joint line: 40 cm    Ambulation     Comments   Use of RW. Lack of L LE heel strike and lack of L knee flexion during swing phase.         See Exercise, Manual, and Modality Logs for complete treatment.       Functional Outcome Score: LEFS=0/80         Assessment & Plan     Assessment  Impairments: abnormal gait, abnormal muscle firing, abnormal or restricted ROM, activity intolerance, impaired balance, impaired physical strength, lacks appropriate home exercise program and pain with function  Functional Limitations: sleeping, walking, uncomfortable because of pain, sitting and standing  Assessment details: Sharonda Preciado is a 57 y.o. year-old female referred to physical therapy for L knee pain s/p TKA on May 24, 2023. She presents with a evolving clinical presentation.  She has comorbidities of recent R TKA (March 2023) and personal factors of living alone which may affect her progress in  the plan of care.  Signs and symptoms are consistent with physical therapy diagnosis of L knee pain and difficulty walking. Objective findings include impaired knee AROM and strength, antalgic gait, and compromised balance. Pt was educated on course of treatment, possible reasons for knee pain, activity modifications, and use of ice/heat PRN. Pt was given a copy of HEP. Patient is appropriate for skilled physical therapy in order to reduce pain and increase ease with daily mobility.     Barriers to therapy: none identified  Prognosis: good    Goals  Plan Goals: STGs to be completed within 30 days:  -Patient will demonstrate compliance and independence with initial HEP  -Patient will increase L Knee AROM to 5-110 degrees to help normalize gait mechanics and increase ease with transfers  -Patient will perform sit to stand transfer with equilateral WB and no UE assistance  -Patient will ambulate household distances with SC in order to reduce reliance on UE support with gait    LTGs to be completed within 90 days:  -Patient will increase L Knee AROM to 0-120 degrees to help normalize gait mechanics and increase ease with transfers  -Patient will complete community mobility without AD and with even step length and heel-toe gait mechanics  -Patient will reduce edema in L knee by 2 cm to help reduce pain/discomfort  -Patient will improve score on LEFS from 0 at eval to 40 or greater to improve quality of life    Plan  Therapy options: will be seen for skilled therapy services  Planned modality interventions: TENS, ultrasound, electrical stimulation/Russian stimulation, dry needling, iontophoresis, hydrotherapy, low level laser therapy and cryotherapy  Planned therapy interventions: joint mobilization, stretching, strengthening, therapeutic activities, transfer training, postural training, manual therapy, ADL retraining, balance/weight-bearing training, dressing changes, flexibility, functional ROM exercises, gait training,  home exercise program, neuromuscular re-education and motor coordination training  Other planned therapy interventions: Aquatic Therapy  Frequency: 2x week (36 visits)  Treatment plan discussed with: patient  Plan details: Gait training, stairs, Balance, Knee ROM/strength, LE stability        Timed:  Manual Therapy:    10     mins  11534;  Therapeutic Exercise:    10     mins  16692;     Neuromuscular Hari:        mins  12729;    Therapeutic Activity:     8     mins  31660;     Gait Training:           mins  19117;     Ultrasound:          mins  73254;    Iontophoresis         mins 80749    Untimed:  Electrical Stimulation:         mins  03596 ( );  Traction:       mins  18913;   Dry Needling   (1-2 muscles)   _     mins 02803 (Self-pay)  Dry Needling (3-4 muscles)  _     mins 68318 (Self-pay)  Dry Needling Trial    _     mins DRYNDLTRIAL  (No Charge)  Low Eval     15     Mins  74620  Mod Eval          Mins  96734  High Eval                            Mins  31019  Re- Eval                           Mins  35830    Timed Treatment:   28   mins   Total Treatment:     55   mins    PT SIGNATURE: Odette Arrieta PT     License Number: [place] [id number]    Electronically signed by Odette Arrieta PT, 05/26/23, 1:07 PM EDT    DATE TREATMENT INITIATED: 5/26/2023    Initial Certification  Certification Period: 8/24/2023  I certify that the therapy services are furnished while this patient is under my care.  The services outlined above are required by this patient, and will be reviewed every 90 days.     PHYSICIAN:  Jeremy Meneses MD  NPI:                                          DATE:     Please sign and return via fax to [phone] Thank you, [facility].

## 2023-05-26 NOTE — TELEPHONE ENCOUNTER
----- Message from Waqas Avery sent at 5/26/2023  9:21 AM EDT -----  Regarding: ORDER  PT CALLED ASKING FOR AN ORDER TO BE PUT INTO Epic FOR PHYSICAL THERAPY. SORRY FOR ALL CHAIM I'M NOT YELLING (: JUST TO LAZY TO START OVER :)

## 2023-05-30 ENCOUNTER — TREATMENT (OUTPATIENT)
Dept: PHYSICAL THERAPY | Facility: CLINIC | Age: 58
End: 2023-05-30

## 2023-05-30 DIAGNOSIS — G89.29 CHRONIC PAIN OF LEFT KNEE: ICD-10-CM

## 2023-05-30 DIAGNOSIS — R26.2 DIFFICULTY WALKING: Primary | ICD-10-CM

## 2023-05-30 DIAGNOSIS — M25.561 CHRONIC PAIN OF RIGHT KNEE: ICD-10-CM

## 2023-05-30 DIAGNOSIS — Z47.1 AFTERCARE FOLLOWING LEFT KNEE JOINT REPLACEMENT SURGERY: ICD-10-CM

## 2023-05-30 DIAGNOSIS — M25.562 CHRONIC PAIN OF LEFT KNEE: ICD-10-CM

## 2023-05-30 DIAGNOSIS — G89.29 CHRONIC PAIN OF RIGHT KNEE: ICD-10-CM

## 2023-05-30 DIAGNOSIS — Z96.652 AFTERCARE FOLLOWING LEFT KNEE JOINT REPLACEMENT SURGERY: ICD-10-CM

## 2023-05-30 NOTE — PROGRESS NOTES
Physical Therapy Daily Treatment Note      Patient: Sharonda Preciado   : 1965  Referring practitioner: No ref. provider found  Date of Initial Visit: Type: THERAPY  Noted: 2023  Today's Date: 2023  Patient seen for 2 sessions         Sharonda Preciado reports: she is still nauseated and pain 5/10 today; pt reports she has already gotten sick today.         Objective     L knee flexion: 101 degrees with strap  L knee extension: 7 from neutral-AROM    See Exercise, Manual, and Modality Logs for complete treatment.       Assessment/Plan  Pt with improving L knee ROM but still limited by nausea/vomitting but able to tolerate additional exercises including hip ABD, HS stretch and Nustep today. Pt with good pain control and compliant with ice and HEP.       Progress per Plan of Care and Progress strengthening /stabilization /functional activity         Timed:  Manual Therapy:    5     mins  89786;  Therapeutic Exercise:    30     mins  48083;     Neuromuscular Hari:    -    mins  06450;    Therapeutic Activity:     -     mins  91931;     Gait Training:      -     mins  99011;     Ultrasound:     -     mins  17581;      Untimed:  Electrical Stimulation:    -     mins  81126 ( );  Mechanical Traction:    -     mins  11999;   Dry needling:      -     mins  06945/    Timed Treatment:   35   mins   Total Treatment:     45   mins  Hilda Morrow PT  Physical Therapist    License #: [id number]

## 2023-06-01 ENCOUNTER — TREATMENT (OUTPATIENT)
Dept: PHYSICAL THERAPY | Facility: CLINIC | Age: 58
End: 2023-06-01

## 2023-06-01 DIAGNOSIS — Z47.1 AFTERCARE FOLLOWING LEFT KNEE JOINT REPLACEMENT SURGERY: ICD-10-CM

## 2023-06-01 DIAGNOSIS — R26.2 DIFFICULTY WALKING: Primary | ICD-10-CM

## 2023-06-01 DIAGNOSIS — G89.29 CHRONIC PAIN OF LEFT KNEE: ICD-10-CM

## 2023-06-01 DIAGNOSIS — M25.562 CHRONIC PAIN OF LEFT KNEE: ICD-10-CM

## 2023-06-01 DIAGNOSIS — Z96.652 AFTERCARE FOLLOWING LEFT KNEE JOINT REPLACEMENT SURGERY: ICD-10-CM

## 2023-06-01 NOTE — PROGRESS NOTES
Physical Therapy Daily Treatment Note      Patient: Sharonda Preciado   : 1965  Referring practitioner: No ref. provider found  Date of Initial Visit: Type: THERAPY  Noted: 2023  Today's Date: 2023  Patient seen for 3 sessions         Sharonda Preciado reports: yesterday she was super sore and increased swelling supra-patellar area after removing surgical dressing yesterday. Pt using cane for support today into clinic.       Objective     L knee swelling at joint line: 44.5 cm; 43 cm post tx  L knee swelling 10 cm above: 58.25 cm; 57.25 cm post tx    R knee circumference: 41 cm  R knee circumberence 10 cm above: 56 cm    - Shae's sign LLE    L knee AROM  L knee ext: lacking 6 degrees from neutral  L knee flexion: 92 degrees    See Exercise, Manual, and Modality Logs for complete treatment.       Assessment/Plan  Pt with mild decrease in ROM today due to increased swelling; added edema massage with L leg elevated on wedge above heart level and also added IFC (estim) with ice at end of session. Pt reports improved pain and decreased swelling by end of session; encouraged to continue ice and elevation at home.         Progress per Plan of Care and Progress strengthening /stabilization /functional activity       Timed:  Manual Therapy:    15     mins  46211;  Therapeutic Exercise:    30     mins  15350;     Neuromuscular Hari:    -    mins  21193;    Therapeutic Activity:     -     mins  05089;     Gait Training:      -     mins  26566;     Ultrasound:     -     mins  21573;      Untimed:  Electrical Stimulation:    15     mins  99337 ( );  Mechanical Traction:    -     mins  56389;   Dry needling:      -     mins  59127/    Timed Treatment:   45   mins   Total Treatment:     60   mins    Hilda Morrow PT  Physical Therapist    License #: [id number]

## 2023-06-05 ENCOUNTER — TREATMENT (OUTPATIENT)
Dept: PHYSICAL THERAPY | Facility: CLINIC | Age: 58
End: 2023-06-05
Payer: COMMERCIAL

## 2023-06-05 DIAGNOSIS — M25.562 CHRONIC PAIN OF LEFT KNEE: ICD-10-CM

## 2023-06-05 DIAGNOSIS — G89.29 CHRONIC PAIN OF LEFT KNEE: ICD-10-CM

## 2023-06-05 DIAGNOSIS — R26.2 DIFFICULTY WALKING: Primary | ICD-10-CM

## 2023-06-05 NOTE — PROGRESS NOTES
Physical Therapy Daily Treatment Note    [facility]  [address], [place] [id number] [phone] (phone)  [phone] (fax)    Patient: Sharonda Preciado   : 1965  Diagnosis/ICD-10 Code:  Difficulty walking [R26.2]  Referring practitioner: No ref. provider found  Date of Initial Visit: Type: THERAPY  Noted: 2023  Today's Date: 2023  Patient seen for 4 sessions           Subjective   Knee is doing much better however is still very stiff.    Objective     See Exercise, Manual, and Modality Logs for complete treatment.       L knee swelling at joint line: 43 cm     L knee AROM  L knee ext: lacking 6 degrees from neutral  L knee flexion: 105 degrees    Assessment/Plan  Patient able to progress to level 5 resistance on NuStep. She is still swollen but she is responding well to edema massage. She demonstrates good improvement in L knee flexion AROM. She is also regaining hip strength and tolerated sidelying hip abduction and supine SLRs today.         Timed:    Manual Therapy:    15     mins  33544;  Therapeutic Exercise:    25     mins  50184;     Neuromuscular Hari:        mins  24253;    Therapeutic Activity:     15     mins  27981;     Gait Training:           mins  10255;     Ultrasound:          mins  56918;    Electrical Stimulation:         mins  96240 ( );  Iontophoresis         mins 63886;  Aquatic Therapy         mins 20073;    Untimed:  Electrical Stimulation:    15     mins  61232 ( );  Traction:         mins  36585;   Dry Needling   (1-2 muscles)       mins  (Self-pay)  Dry Needling (3-4 muscles)        mins  (Self-pay)  Dry Needling Trial          mins DRYNDLTRIAL  (No Charge)    Timed Treatment:   55   mins   Total Treatment:     70   mins    Odette Arrieta PT  Physical Therapist    [place] License:[id number]

## 2023-06-07 ENCOUNTER — TREATMENT (OUTPATIENT)
Dept: PHYSICAL THERAPY | Facility: CLINIC | Age: 58
End: 2023-06-07
Payer: COMMERCIAL

## 2023-06-07 DIAGNOSIS — G89.29 CHRONIC PAIN OF LEFT KNEE: ICD-10-CM

## 2023-06-07 DIAGNOSIS — Z47.1 AFTERCARE FOLLOWING LEFT KNEE JOINT REPLACEMENT SURGERY: ICD-10-CM

## 2023-06-07 DIAGNOSIS — R26.2 DIFFICULTY WALKING: Primary | ICD-10-CM

## 2023-06-07 DIAGNOSIS — M25.562 CHRONIC PAIN OF LEFT KNEE: ICD-10-CM

## 2023-06-07 DIAGNOSIS — Z96.652 AFTERCARE FOLLOWING LEFT KNEE JOINT REPLACEMENT SURGERY: ICD-10-CM

## 2023-06-07 NOTE — PROGRESS NOTES
Physical Therapy Daily Treatment Note    [facility]  [address] [place] [address]  [phone] (phone)  [phone] (fax)    Patient: Sharonda Preciado   : 1965  Diagnosis/ICD-10 Code:  Difficulty walking [R26.2]  Referring practitioner: Jeremy Meneses MD  Date of Initial Visit: Type: THERAPY  Noted: 2023  Today's Date: 2023  Patient seen for 5 sessions           Subjective   Patient reports she has been on her feet more than normal today.    Objective     See Exercise, Manual, and Modality Logs for complete treatment.     Assessment/Plan  Patient able to achieve full revolutions on upright bike today indicating improved knee flexion/joint mobility. Also incorporated more functional strengthening such as squats to increase ease with transfers and promote more equal WB. Patient still complains of generalized muscle tightness and knee stiffness, which is a little more pronounced today with the rain.          Timed:    Manual Therapy:    8     mins  33997;  Therapeutic Exercise:    12     mins  41012;     Neuromuscular Hari:        mins  42256;    Therapeutic Activity:     10     mins  89007;     Gait Training:           mins  45094;     Ultrasound:          mins  39011;    Electrical Stimulation:         mins  98262 ( );  Iontophoresis         mins 24249;  Aquatic Therapy         mins 52157;    Untimed:  Electrical Stimulation:   15      mins  97301 ( );  Traction:         mins  66094;   Dry Needling   (1-2 muscles)       mins  (Self-pay)  Dry Needling (3-4 muscles)        mins  (Self-pay)  Dry Needling Trial          mins DRYNDLTRIAL  (No Charge)    Timed Treatment:   30   mins   Total Treatment:     55   mins    Odette Arrieta PT  Physical Therapist    [place] License:[id number]

## 2023-06-09 ENCOUNTER — TREATMENT (OUTPATIENT)
Dept: PHYSICAL THERAPY | Facility: CLINIC | Age: 58
End: 2023-06-09
Payer: COMMERCIAL

## 2023-06-09 DIAGNOSIS — M25.562 CHRONIC PAIN OF LEFT KNEE: ICD-10-CM

## 2023-06-09 DIAGNOSIS — Z96.652 AFTERCARE FOLLOWING LEFT KNEE JOINT REPLACEMENT SURGERY: ICD-10-CM

## 2023-06-09 DIAGNOSIS — R26.2 DIFFICULTY WALKING: Primary | ICD-10-CM

## 2023-06-09 DIAGNOSIS — Z47.1 AFTERCARE FOLLOWING LEFT KNEE JOINT REPLACEMENT SURGERY: ICD-10-CM

## 2023-06-09 DIAGNOSIS — G89.29 CHRONIC PAIN OF LEFT KNEE: ICD-10-CM

## 2023-06-09 NOTE — PROGRESS NOTES
"Physical Therapy Daily Treatment Note    [facility]  [address], [place] [id number] [phone] (phone)  [phone] (fax)    Patient: Sharonda Preciado   : 1965  Diagnosis/ICD-10 Code:  Difficulty walking [R26.2]  Referring practitioner: Jeremy Meneses MD  Date of Initial Visit: Type: THERAPY  Noted: 2023  Today's Date: 2023  Patient seen for 6 sessions           Subjective   Sharonda reports knee is great overall, just a little tight.    Objective     See Exercise, Manual, and Modality Logs for complete treatment.     Assessment/Plan  Sharonda was able to increase speed of her revolutions on bike today. Added single leg strengthening on leg press to try and isolate L LE, however patient performed movement through only partial ROM to maintain muscle control. She still has weakness when trying to ascend 4\" step height however she is able to limit UE support/compensation.         Timed:    Manual Therapy:    8     mins  89078;  Therapeutic Exercise:    14     mins  50486;     Neuromuscular Hari:        mins  85121;    Therapeutic Activity:     8     mins  71889;     Gait Training:           mins  62478;     Ultrasound:          mins  58226;    Electrical Stimulation:         mins  77920 ( );  Iontophoresis         mins 24136;  Aquatic Therapy         mins 00060;    Untimed:  Electrical Stimulation:         mins  31796 ( );  Traction:         mins  25955;   Dry Needling   (1-2 muscles)       mins  (Self-pay)  Dry Needling (3-4 muscles)        mins  (Self-pay)  Dry Needling Trial          mins DRYNDLTRIAL  (No Charge)    Timed Treatment:   30   mins   Total Treatment:     50   mins    Odette Arrieta PT  Physical Therapist    [place] License:[id number]  "

## 2023-06-12 ENCOUNTER — TREATMENT (OUTPATIENT)
Dept: PHYSICAL THERAPY | Facility: CLINIC | Age: 58
End: 2023-06-12
Payer: COMMERCIAL

## 2023-06-12 DIAGNOSIS — Z47.1 AFTERCARE FOLLOWING LEFT KNEE JOINT REPLACEMENT SURGERY: ICD-10-CM

## 2023-06-12 DIAGNOSIS — Z96.652 AFTERCARE FOLLOWING LEFT KNEE JOINT REPLACEMENT SURGERY: ICD-10-CM

## 2023-06-12 DIAGNOSIS — R26.2 DIFFICULTY WALKING: Primary | ICD-10-CM

## 2023-06-12 PROCEDURE — 97110 THERAPEUTIC EXERCISES: CPT | Performed by: PHYSICAL THERAPIST

## 2023-06-12 PROCEDURE — 97112 NEUROMUSCULAR REEDUCATION: CPT | Performed by: PHYSICAL THERAPIST

## 2023-06-12 PROCEDURE — 97530 THERAPEUTIC ACTIVITIES: CPT | Performed by: PHYSICAL THERAPIST

## 2023-06-12 NOTE — PROGRESS NOTES
"Physical Therapy Daily Treatment Note    [facility]  [address], [place] [address] [phone] (phone)  [phone] (fax)    Patient: Sharonda Preciado   : 1965  Diagnosis/ICD-10 Code:  Difficulty walking [R26.2]  Referring practitioner: Jeremy Meneses MD  Date of Initial Visit: Type: THERAPY  Noted: 2023  Today's Date: 2023  Patient seen for 7 sessions           Subjective   Knee stiffness has gone away and pain is gone    Objective     See Exercise, Manual, and Modality Logs for complete treatment.     L knee flexion PROM=115 degrees    Assessment/Plan  Patient able to add a set on leg press. Also progressed to 6\" step height to increase ease with stairs. Added step downs to address eccentric quad strength. Patient is walking longer distances and dealing with less knee stiffness. She plans to return to gym soon for additional workouts. Since she is progressing  so well, plan to decrease frequency of PT to 2x/week         Timed:    Manual Therapy:   2      mins  99210;  Therapeutic Exercise:    24     mins  61450;     Neuromuscular Hari:    10    mins  35502;    Therapeutic Activity:     10     mins  88651;     Gait Training:           mins  90355;     Ultrasound:          mins  04624;    Electrical Stimulation:         mins  59655 ( );  Iontophoresis         mins 71599;  Aquatic Therapy         mins 20674;    Untimed:  Electrical Stimulation:         mins  76994 ( );  Traction:         mins  94897;   Dry Needling   (1-2 muscles)       mins  (Self-pay)  Dry Needling (3-4 muscles)        mins  (Self-pay)  Dry Needling Trial          mins DRYNDLTRIAL  (No Charge)    Timed Treatment:   46   mins   Total Treatment:     56   mins    Odette Arrieta PT  Physical Therapist    [place] License:[id number]  "

## 2023-06-15 ENCOUNTER — OFFICE VISIT (OUTPATIENT)
Dept: ORTHOPEDIC SURGERY | Facility: CLINIC | Age: 58
End: 2023-06-15
Payer: COMMERCIAL

## 2023-06-15 VITALS — WEIGHT: 186 LBS | BODY MASS INDEX: 34.23 KG/M2 | HEIGHT: 62 IN

## 2023-06-15 DIAGNOSIS — Z96.652 S/P TKR (TOTAL KNEE REPLACEMENT), LEFT: Primary | ICD-10-CM

## 2023-06-15 PROCEDURE — 99024 POSTOP FOLLOW-UP VISIT: CPT | Performed by: INTERNAL MEDICINE

## 2023-06-15 NOTE — PROGRESS NOTES
Subjective:     Patient ID: Sharonda Preciado is a 57 y.o. female.    Chief Complaint:    History of Present Illness  Sharonda Preciado returns to clinic today for evaluation of left knee status post total knee arthroplasty on 2023.  The patient is now 3 weeks out and is doing exceptionally well.  She states she has no pain and near full range of motion.  Physical therapy they measured her at 2 degrees to 120 degrees.  She is thrilled with the result and is back to work.  She denies any redness or drainage from her incisions.  Her right knee which was replaced earlier this year is also doing quite well.  She is thrilled with the results.  She states that they are about ready to discharge her from physical therapy because she has made such a great recovery.     Social History     Occupational History    Not on file   Tobacco Use    Smoking status: Never     Passive exposure: Never    Smokeless tobacco: Never   Vaping Use    Vaping Use: Never used   Substance and Sexual Activity    Alcohol use: No    Drug use: Never    Sexual activity: Not Currently     Partners: Male     Birth control/protection: None      Past Medical History:   Diagnosis Date    Anemia     Anticardiolipin antibody positive     Antiphospholipid antibody positive     Arthritis     Hypertension     Hypothyroid     Knee swelling     Sleep apnea with use of continuous positive airway pressure (CPAP)      Past Surgical History:   Procedure Laterality Date     SECTION      COLONOSCOPY      DILATATION AND CURETTAGE      KNEE SURGERY      TOTAL KNEE ARTHROPLASTY Right 03/15/2023    Procedure: TOTAL KNEE ARTHROPLASTY WITH CORI ROBOT;  Surgeon: Jeremy Meneses MD;  [facility];  Service: Rob[facility] - Ortho;  Laterality: Right;  RIGHT TOTAL KNEE ARTHROPLASTY WITH CORI ROBOT    TOTAL KNEE ARTHROPLASTY Left 2023    Procedure: TOTAL KNEE ARTHROPLASTY WITH CORI ROBOT;  Surgeon: Jeremy Meneses MD;  Location: [facility];  Service: [facility] -  "Ortho;  Laterality: Left;       Family History   Problem Relation Age of Onset    Heart disease Mother     Diabetes Mother     Heart disease Father     Rheumatologic disease Maternal Uncle         Sjorgrens    Rheumatologic disease Maternal Grandmother         TITA Hel[place] Hyperthermia Neg Hx                  Objective:  Vitals:    06/15/23 1009   Weight: 84.4 kg (186 lb)   Height: 157.5 cm (62\")         06/15/23  1009   Weight: 84.4 kg (186 lb)     Body mass index is 34.02 kg/m².        Right Knee Exam     Muscle Strength   The patient has normal right knee strength.    Tenderness   The patient is experiencing no tenderness.     Range of Motion   Extension:  0   Flexion:  130     Tests   Varus: negative Valgus: negative  Lachman:  Anterior - negative    Posterior - negative  Drawer:  Anterior - negative    Posterior - negative    Other   Erythema: absent  Scars: present  Sensation: normal  Pulse: present  Swelling: none  Effusion: no effusion present      Left Knee Exam     Muscle Strength   The patient has normal left knee strength.    Tenderness   The patient is experiencing no tenderness.     Range of Motion   Extension:  0   Flexion:  120     Tests   Varus: negative Valgus: negative  Lachman:  Anterior - negative    Posterior - negative    Other   Erythema: absent  Scars: present  Sensation: normal  Pulse: present  Swelling: none  Effusion: no effusion present    Comments:  Incision healed with no signs of infection.  No erythema drainage fluctuance induration.             Imagin views of the left knee were ordered and reviewed by myself in the office today  Indication: Left knee replacement  Findings: X-rays demonstrate a left total knee arthroplasty with implants in expected position.  Patella is unresurfaced and the tibial and femoral components are cemented.  No signs of loosening fracture dislocation subsidence or migration.  No acute osseous abnormality  Comparative studies: Immediate postoperative " films    Assessment:        1. S/P TKR (total knee replacement), left           Plan:          Discussed treatment options at length with patient at today's visit.  I discussed with the patient that she should continue taking the aspirin for a total of 4 weeks.  She may resume activities as tolerated from my standpoint.  She should continue going for physical therapy until they discharge her.  She is forehead and her recovery can.  To where she should be expected to be at this point.  She has near full range of motion and has no pain or swelling.  I discussed with the patient that I like to see her back in 4 weeks for 6-week checkup.  Follow up: 4 weeks without x-rays      Sharonda Preciado was in agreement with plan and had all questions answered.     Medications:  No orders of the defined types were placed in this encounter.      Followup:  No follow-ups on file.    Diagnoses and all orders for this visit:    1. S/P TKR (total knee replacement), left (Primary)  -     XR Knee 1 or 2 View Left          Dictated utilizing Dragon dictation

## 2023-06-16 ENCOUNTER — TREATMENT (OUTPATIENT)
Dept: PHYSICAL THERAPY | Facility: CLINIC | Age: 58
End: 2023-06-16
Payer: COMMERCIAL

## 2023-06-16 DIAGNOSIS — R26.2 DIFFICULTY WALKING: Primary | ICD-10-CM

## 2023-06-16 DIAGNOSIS — Z47.1 AFTERCARE FOLLOWING LEFT KNEE JOINT REPLACEMENT SURGERY: ICD-10-CM

## 2023-06-16 DIAGNOSIS — G89.29 CHRONIC PAIN OF LEFT KNEE: ICD-10-CM

## 2023-06-16 DIAGNOSIS — M25.562 CHRONIC PAIN OF LEFT KNEE: ICD-10-CM

## 2023-06-16 DIAGNOSIS — Z96.652 AFTERCARE FOLLOWING LEFT KNEE JOINT REPLACEMENT SURGERY: ICD-10-CM

## 2023-06-16 PROCEDURE — 97530 THERAPEUTIC ACTIVITIES: CPT | Performed by: PHYSICAL THERAPIST

## 2023-06-16 PROCEDURE — 97110 THERAPEUTIC EXERCISES: CPT | Performed by: PHYSICAL THERAPIST

## 2023-06-16 PROCEDURE — 97112 NEUROMUSCULAR REEDUCATION: CPT | Performed by: PHYSICAL THERAPIST

## 2023-06-16 NOTE — PROGRESS NOTES
Physical Therapy Daily Treatment Note      Patient: Sharonda Preciado   : 1965  Referring practitioner: Jeremy Meneses MD  Date of Initial Visit: Type: THERAPY  Noted: 2023  Today's Date: 2023  Patient seen for 8 sessions         Sharonda Preciado reports: no pain today; driving herself to PT today, no more swelling at supra-patellar. Pt is going to [place] next week for a concert with her daughter, worried  about walking and being on her feet that long.         Objective     L knee flexion: 112 degrees  L knee ext: 0 degrees    See Exercise, Manual, and Modality Logs for complete treatment.       Assessment/Plan  Added prone quad stretch to improve knee flexion; minimal cues for exercises today. No c/o pain with exercises. Pt reports good follow up with surgeon yesterday; no concerns, continue PT, next follow in 4 weeks. Continue to progress strength, ROM and stability of L knee to improve ease of functional mobility.        Progress per Plan of Care and Progress strengthening /stabilization /functional activity           Timed:  Manual Therapy:    -     mins  70492;  Therapeutic Exercise:    30     mins  80183;     Neuromuscular Hari:    10   mins  95865;    Therapeutic Activity:     15     mins  48752;     Gait Training:      -     mins  02919;     Ultrasound:     -     mins  76799;      Untimed:  Electrical Stimulation:    -     mins  92962 ( );  Mechanical Traction:    -     mins  04465;   Dry needling:      -     mins  27072/    Timed Treatment:   55   mins   Total Treatment:     65   mins    Hilda Morrow PT  Physical Therapist    License #: [id number]

## 2023-06-19 ENCOUNTER — TREATMENT (OUTPATIENT)
Dept: PHYSICAL THERAPY | Facility: CLINIC | Age: 58
End: 2023-06-19
Payer: COMMERCIAL

## 2023-06-19 DIAGNOSIS — M25.561 CHRONIC PAIN OF RIGHT KNEE: ICD-10-CM

## 2023-06-19 DIAGNOSIS — G89.29 CHRONIC PAIN OF LEFT KNEE: ICD-10-CM

## 2023-06-19 DIAGNOSIS — R26.2 DIFFICULTY WALKING: Primary | ICD-10-CM

## 2023-06-19 DIAGNOSIS — Z47.1 AFTERCARE FOLLOWING LEFT KNEE JOINT REPLACEMENT SURGERY: ICD-10-CM

## 2023-06-19 DIAGNOSIS — G89.29 CHRONIC PAIN OF RIGHT KNEE: ICD-10-CM

## 2023-06-19 DIAGNOSIS — M25.562 CHRONIC PAIN OF LEFT KNEE: ICD-10-CM

## 2023-06-19 DIAGNOSIS — Z96.652 AFTERCARE FOLLOWING LEFT KNEE JOINT REPLACEMENT SURGERY: ICD-10-CM

## 2023-06-19 PROCEDURE — 97530 THERAPEUTIC ACTIVITIES: CPT | Performed by: PHYSICAL THERAPIST

## 2023-06-19 PROCEDURE — 97110 THERAPEUTIC EXERCISES: CPT | Performed by: PHYSICAL THERAPIST

## 2023-06-19 NOTE — PROGRESS NOTES
"   Physical Therapy Re-Assessment/Re-Certification      Patient: Sharonda Preciado   : 1965  Referring practitioner: Jeremy Meneses MD  Date of Initial Visit: Type: THERAPY  Noted: 2023  Today's Date: 2023  Patient seen for 9 sessions         Sharonda Preciado reports: no pain, improving swelling and ROM. \"I feel normal\"  LEFS: 80/80  Clinical Progress: improved  Home Program Compliance: Yes  Treatment has included: therapeutic exercise, neuromuscular re-education, manual therapy, therapeutic activity, gait training, electrical stimulation and cryotherapy     Objective   AROM  L knee flexion: 122 degrees  L knee ext: 0 degrees    Strength  L knee  Flexion: 4-/5  Ext: 4/5    R knee  Flexion: 5/5  Ext: 5/5    Swelling  L knee circumference: 44.5 cm at joint line  R knee circumference: 42.5 cm at joint line    Ambulation     No AD, even step length, no antalgia    See Exercise, Manual, and Modality Logs for complete treatment.       Assessment & Plan     Assessment  Impairments: abnormal or restricted ROM, activity intolerance, impaired balance and impaired physical strength  Functional Limitations: walking and uncomfortable because of pain  Assessment details: Sharonda Preciado is a 57 y.o. year-old female referred to physical therapy for L knee pain s/p TKA on May 24, 2023. She has comorbidities of recent R TKA (2023) and personal factors of living alone which may affect her progress in the plan of care.  Pt is 3 week post op with L knee AROM WNL, decreased swelling, improved gait mechanics (no AD) WNL, increased strength and stability but not full yet. Pt would benefit from continued skilled PT to address strength deficits and decrease swelling to be able to return to PLOF and regular workouts without B knee pain. Pt able to tolerate addition of ankle weight to HS, quad and hip strengthening, increased weight with leg press, increased step height and added cable walks. Patient is appropriate for skilled " physical therapy in order to reduce pain and increase ease with daily mobility.    Prognosis: good    Goals  Plan Goals: STGs to be completed within 30 days:  -Patient will demonstrate compliance and independence with initial HEP-MET  -Patient will increase L Knee AROM to 5-110 degrees to help normalize gait mechanics and increase ease with transfers-MET  -Patient will perform sit to stand transfer with equilateral WB and no UE assistance-MET  -Patient will ambulate household distances with SC in order to reduce reliance on UE support with gait-MET    LTGs to be completed within 90 days:  -Patient will increase L Knee AROM to 0-120 degrees to help normalize gait mechanics and increase ease with transfers-MET  -Patient will complete community mobility without AD and with even step length and heel-toe gait mechanics-MET  -Patient will reduce edema in L knee by 2 cm to help reduce pain/discomfort-PROGRESSING  -Patient will improve score on LEFS from 0 at eval to 40 or greater to improve quality of life-MET  NEW:  -Patient will improve L knee strength to 5/5 to be able to complete functional mobility and return to the gym without compensation and decrease risk for future injuries.-PROGRESSING    Plan  Therapy options: will be seen for skilled therapy services  Planned modality interventions: TENS, ultrasound, electrical stimulation/Russian stimulation, dry needling, iontophoresis, hydrotherapy, low level laser therapy and cryotherapy  Planned therapy interventions: joint mobilization, stretching, strengthening, therapeutic activities, transfer training, postural training, manual therapy, ADL retraining, balance/weight-bearing training, dressing changes, flexibility, functional ROM exercises, gait training, home exercise program, neuromuscular re-education and motor coordination training  Other planned therapy interventions: Aquatic Therapy  Frequency: 2x week  Treatment plan discussed with: patient  Plan details: 2x per  week for 6 weeks         Timed:  Manual Therapy:    -     mins  20685;  Therapeutic Exercise:    25     mins  87931;     Neuromuscular Hari:    -    mins  11759;    Therapeutic Activity:     25     mins  27832;     Gait Training:      -     mins  90528;     Ultrasound:     -     mins  42915;      Untimed:  Electrical Stimulation:    -     mins  83035 ( );  Mechanical Traction:    -     mins  69539;   Dry needling:      -     mins  52315/20561    Timed Treatment:   50   mins   Total Treatment:     60   mins  Hilda Morrow PT  Physical Therapist    License #: [id number]

## 2023-08-07 ENCOUNTER — DOCUMENTATION (OUTPATIENT)
Dept: PHYSICAL THERAPY | Facility: CLINIC | Age: 58
End: 2023-08-07
Payer: COMMERCIAL

## 2023-08-07 DIAGNOSIS — R26.2 DIFFICULTY WALKING: Primary | ICD-10-CM

## 2023-08-07 DIAGNOSIS — Z96.652 AFTERCARE FOLLOWING LEFT KNEE JOINT REPLACEMENT SURGERY: ICD-10-CM

## 2023-08-07 DIAGNOSIS — Z47.1 AFTERCARE FOLLOWING LEFT KNEE JOINT REPLACEMENT SURGERY: ICD-10-CM

## 2023-10-17 ENCOUNTER — OFFICE VISIT (OUTPATIENT)
Dept: ORTHOPEDIC SURGERY | Facility: CLINIC | Age: 58
End: 2023-10-17
Payer: COMMERCIAL

## 2023-10-17 VITALS — HEIGHT: 62 IN | BODY MASS INDEX: 34.23 KG/M2 | WEIGHT: 186 LBS

## 2023-10-17 DIAGNOSIS — M17.0 PRIMARY OSTEOARTHRITIS OF KNEES, BILATERAL: Primary | ICD-10-CM

## 2023-10-17 NOTE — PROGRESS NOTES
Subjective:     Patient ID: Sharonda Preciado is a 57 y.o. female.    Chief Complaint:    History of Present Illness  Sharonda Preciado returns to clinic today for evaluation of bilateral knee arthroplasties.  Her right knee was done in March and her left knee was done in May.  The patient states she has no complaints and is back to her old self.  She states she recently returned from a [place] trip to [place] where she rode her bike for 8 miles and in the middle of it did a hike requiring at least 200 steps.  She had no issues or pain whatsoever.  She says she occasionally feels a click but is not painful and she thinks that it is normal.  She could not be happier.     Social History     Occupational History    Not on file   Tobacco Use    Smoking status: Never     Passive exposure: Never    Smokeless tobacco: Never   Vaping Use    Vaping Use: Never used   Substance and Sexual Activity    Alcohol use: No    Drug use: Never    Sexual activity: Not Currently     Partners: Male     Birth control/protection: None      Past Medical History:   Diagnosis Date    Anemia     Anticardiolipin antibody positive     Antiphospholipid antibody positive     Arthritis     Hypertension     Hypothyroid     Knee swelling     Sleep apnea with use of continuous positive airway pressure (CPAP)      Past Surgical History:   Procedure Laterality Date     SECTION      COLONOSCOPY      DILATATION AND CURETTAGE      KNEE SURGERY      TOTAL KNEE ARTHROPLASTY Right 03/15/2023    Procedure: TOTAL KNEE ARTHROPLASTY WITH CORI ROBOT;  Surgeon: Jeremy Meneses MD;  Location: [facility] OR;  Service: [facility] - Ortho;  Laterality: Right;  RIGHT TOTAL KNEE ARTHROPLASTY WITH CORI ROBOT    TOTAL KNEE ARTHROPLASTY Left 2023    Procedure: TOTAL KNEE ARTHROPLASTY WITH CORI ROBOT;  Surgeon: Jeremy Meneses MD;  Location: [facility] OR;  Service: Robotics - Ortho;  Laterality: Left;       Family History   Problem Relation Age of Onset    Heart  "disease Mother     Diabetes Mother     Heart disease Father     Rheumatologic disease Maternal Uncle         Sjorgrens    Rheumatologic disease Maternal Grandmother         RA    [place] Hyperthermia Neg Hx                  Objective:  Vitals:    10/17/23 0824   Weight: 84.4 kg (186 lb)   Height: 157.5 cm (62\")         10/17/23  0824   Weight: 84.4 kg (186 lb)     Body mass index is 34.02 kg/m².        Right Knee Exam     Tenderness   The patient is experiencing no tenderness.     Range of Motion   Extension:  0   Flexion:  130     Tests   Varus: negative Valgus: negative  Lachman:  Anterior - negative    Posterior - negative  Drawer:  Anterior - negative    Posterior - negative    Other   Erythema: absent  Scars: absent  Sensation: normal  Pulse: present  Swelling: none  Effusion: no effusion present      Left Knee Exam     Tenderness   The patient is experiencing no tenderness.     Range of Motion   Extension:  0   Flexion:  130     Tests   Varus: negative Valgus: negative  Lachman:  Anterior - negative    Posterior - negative  Drawer:  Anterior - negative     Posterior - negative    Other   Erythema: absent  Scars: absent  Sensation: normal  Pulse: present  Swelling: none  Effusion: no effusion present               Imaging: 3 views of the bilateral knee were ordered and reviewed by myself in the office today  Indication: bilateral knee replacement  Findings: X-rays demonstrate a cemented bilateral total knee arthroplasty with an unresurfaced patella with implants in expected position no signs of loosening fracture, dislocation, subluxation, wear or subsidence.  No new acute osseous abnormality.  Comparative studies: last visit radiographs      Assessment:        1. Primary osteoarthritis of knees, bilateral           Plan:          Discussed treatment options at length with patient at today's visit.  Discussed the patient that I am thrilled she is doing so well.  She is currently having no residual symptoms and " has no deficits.  She is back to being more active than she was prior to surgery.  And happy she was able to bike and hike and climb stairs on her trip to [facility] with her girlfriends.  I discussed with her that at this point time we do not need to see her back until May 2020 for which will be 1 year out from her second knee replacement.  I did discuss that I am happy to see her back sooner if she has any questions or concerns that arise.  Follow up: May 2024 with 3 views of bilateral knees      Sharonda Preciado was in agreement with plan and had all questions answered.     Medications:  No orders of the defined types were placed in this encounter.      Followup:  No follow-ups on file.    Diagnoses and all orders for this visit:    1. Primary osteoarthritis of knees, bilateral (Primary)  -     XR Knee 3 View Bilateral          Dictated utilizing Dragon dictation

## 2024-05-07 ENCOUNTER — OFFICE VISIT (OUTPATIENT)
Dept: ORTHOPEDIC SURGERY | Facility: CLINIC | Age: 59
End: 2024-05-07
Payer: COMMERCIAL

## 2024-05-07 VITALS — WEIGHT: 186 LBS | HEIGHT: 62 IN | BODY MASS INDEX: 34.23 KG/M2

## 2024-05-07 DIAGNOSIS — M17.0 PRIMARY OSTEOARTHRITIS OF KNEES, BILATERAL: Primary | ICD-10-CM

## 2024-05-07 PROCEDURE — 99213 OFFICE O/P EST LOW 20 MIN: CPT | Performed by: INTERNAL MEDICINE

## 2024-05-07 PROCEDURE — 73562 X-RAY EXAM OF KNEE 3: CPT | Performed by: INTERNAL MEDICINE

## 2024-05-07 RX ORDER — TIRZEPATIDE 10 MG/.5ML
INJECTION, SOLUTION SUBCUTANEOUS
COMMUNITY
Start: 2024-04-25

## 2025-05-06 ENCOUNTER — OFFICE VISIT (OUTPATIENT)
Dept: ORTHOPEDIC SURGERY | Facility: CLINIC | Age: 60
End: 2025-05-06
Payer: COMMERCIAL

## 2025-05-06 VITALS — WEIGHT: 173.8 LBS | BODY MASS INDEX: 31.98 KG/M2 | HEIGHT: 62 IN

## 2025-05-06 DIAGNOSIS — Z96.651 STATUS POST TOTAL RIGHT KNEE REPLACEMENT: ICD-10-CM

## 2025-05-06 DIAGNOSIS — Z96.652 S/P TKR (TOTAL KNEE REPLACEMENT), LEFT: Primary | ICD-10-CM

## 2025-05-06 RX ORDER — ROSUVASTATIN CALCIUM 10 MG/1
10 TABLET, COATED ORAL
COMMUNITY

## 2025-05-06 NOTE — PROGRESS NOTES
Subjective:     Patient ID: Sharonda Preciado is a 59 y.o. female.    Chief Complaint:    History of Present Illness  Sharonda Preciado returns to clinic today for evaluation of bilateral knee status post total knee arthroplasty performed by myself  2 years  ago.  She is no longer attending physical therapy and progressing well.  The patient denies any fevers chills or drainage from their surgical incision. She is happy with the result so far. She states that the pain and swelling are improving.  She is ambulatory today with no limp or assistive device       Social History     Occupational History    Not on file   Tobacco Use    Smoking status: Never     Passive exposure: Never    Smokeless tobacco: Never   Vaping Use    Vaping status: Never Used   Substance and Sexual Activity    Alcohol use: No    Drug use: Never    Sexual activity: Not Currently     Partners: Male     Birth control/protection: None      Past Medical History:   Diagnosis Date    Allergic     Shellfish    Anemia     Anticardiolipin antibody positive     Antiphospholipid antibody positive     Anxiety     Arthritis     Depression     Hypertension     Hypothyroid     Knee swelling     Sleep apnea with use of continuous positive airway pressure (CPAP)      Past Surgical History:   Procedure Laterality Date     SECTION      COLONOSCOPY      DILATATION AND CURETTAGE      KNEE SURGERY      TOTAL KNEE ARTHROPLASTY Right 03/15/2023    Procedure: TOTAL KNEE ARTHROPLASTY WITH CORI ROBOT;  Surgeon: Jeremy Meneses MD;  Location: [facility];  Service: Robotics - Ortho;  Laterality: Right;  RIGHT TOTAL KNEE ARTHROPLASTY WITH CORI ROBOT    TOTAL KNEE ARTHROPLASTY Left 2023    Procedure: TOTAL KNEE ARTHROPLASTY WITH CORI ROBOT;  Surgeon: Jeremy Meneses MD;  Location: [facility] OR;  Service: Robotics - Ortho;  Laterality: Left;       Family History   Problem Relation Age of Onset    Heart disease Mother     Diabetes Mother     Heart disease Father      "Rheumatologic disease Maternal Uncle         Sjorgrens    Rheumatologic disease Maternal Grandmother         RA    Danie Hyperthermia Neg Hx                  Objective:  Vitals:    25   Weight: 78.8 kg (173 lb 12.8 oz)   Height: 157.5 cm (62\")         25   Weight: 78.8 kg (173 lb 12.8 oz)     Body mass index is 31.79 kg/m².  BMI is >= 30 and <35. (Class 1 Obesity). The following options were offered after discussion;: referral to primary care        Right Knee Exam     Muscle Strength   The patient has normal right knee strength.    Tenderness   The patient is experiencing no tenderness.     Range of Motion   Extension:  0   Flexion:  120     Tests   Varus: negative Valgus: negative  Lachman:  Anterior - negative    Posterior - negative  Drawer:  Anterior - negative    Posterior - negative    Other   Erythema: absent  Scars: present  Sensation: normal  Pulse: present  Swelling: none  Effusion: no effusion present      Left Knee Exam     Muscle Strength   The patient has normal left knee strength.    Tenderness   The patient is experiencing no tenderness.     Range of Motion   Extension:  0   Flexion:  120     Tests   Varus: negative Valgus: negative  Lachman:  Anterior - negative    Posterior - negative  Drawer:  Anterior - negative     Posterior - negative    Other   Erythema: absent  Scars: present  Sensation: normal  Pulse: present  Swelling: none  Effusion: no effusion present             Imagin views of the bilateral knee were ordered and reviewed by myself in the office today  Indication: bilateral knee replacement  Findings: X-rays demonstrate a cemented bilateral total knee arthroplasty with an unresurfaced patella with implants in expected position no signs of loosening fracture, dislocation, subluxation, wear or subsidence.  No new acute osseous abnormality.  Comparative studies: last visit radiographs      Assessment:        1. S/P TKR (total knee replacement), left    2. Status " post total right knee replacement           Plan:          Discussed treatment options at length with patient at today's visit. I discussed with the patient that they are doing well from my standpoint.  I am happy with the progress that they have made.  They are ambulatory today with no assistive device and no limp or residual deficits.  I would like them to continue to work on range of motion and strengthening exercises.   The patient has no restrictions from my standpoint.  The patient's surgical incision is healed without signs of infection.  I would like the patient to notify our office immediately if they note any increasing redness, pain, fevers, chills, or drainage of any kind from their surgical incision as this would be abnormal at this point in time.  I discussed with the patient that at this point in time we do not need to see them back for another follow-up appointment.  I did discuss however that I am happy to see the patient at any point if issues questions or concerns arise.  The patient voiced understanding and agreement with the plan.   Follow up: as needed      Sharonda Ozzy was in agreement with plan and had all questions answered.     Medications:  No orders of the defined types were placed in this encounter.      Followup:  No follow-ups on file.    Diagnoses and all orders for this visit:    1. S/P TKR (total knee replacement), left (Primary)  -     XR Knee 3 View Bilateral    2. Status post total right knee replacement  -     XR Knee 3 View Bilateral          Dictated utilizing Dragon dictation

## (undated) DEVICE — ADHS SKIN SURG TISS VISC PREMIERPRO EXOFIN HI/VISC FAST/DRY

## (undated) DEVICE — TRAP FLD MINIVAC MEGADYNE 100ML

## (undated) DEVICE — TBG PENCL TELESCP MEGADYNE SMOKE EVAC 10FT

## (undated) DEVICE — Device

## (undated) DEVICE — SYRINGE ONLY,20ML LUER LOCK: Brand: MEDLINE INDUSTRIES, INC.

## (undated) DEVICE — SPNG GZ WOVN 4X4IN 12PLY 10/BX STRL

## (undated) DEVICE — WRAP KNEE COLD THERAPY

## (undated) DEVICE — FOAM BUMP ROUND LARGE: Brand: MEDLINE INDUSTRIES, INC.

## (undated) DEVICE — PATIENT RETURN ELECTRODE, SINGLE-USE, CONTACT QUALITY MONITORING, ADULT, WITH 9FT CORD, FOR PATIENTS WEIGING OVER 33LBS. (15KG): Brand: MEGADYNE

## (undated) DEVICE — 3M™ STERI-DRAPE™ INSTRUMENT POUCH 1018: Brand: STERI-DRAPE™

## (undated) DEVICE — SOL IRR H2O BTL 1000ML STRL

## (undated) DEVICE — KT MIX CMT PALAMIX/UNO VAC WO/CMT

## (undated) DEVICE — SYR LUERLOK 30CC

## (undated) DEVICE — GLV SURG SENSICARE PI LF PF 8 GRN STRL

## (undated) DEVICE — PEEL-AWAY TOGA, 2X LARGE: Brand: FLYTE

## (undated) DEVICE — SYS MIX CLEARMIX SGL/DBL VAC

## (undated) DEVICE — APPL CHLORAPREP HI/LITE 26ML ORNG

## (undated) DEVICE — 3M™ IOBAN™ 2 ANTIMICROBIAL INCISE DRAPE 6651EZ: Brand: IOBAN™ 2

## (undated) DEVICE — WEREWOLF FASTSEAL 6.0 HEMOSTASIS WAND: Brand: FASTSEAL 6.0 HEMOSTASIS WAND

## (undated) DEVICE — GLV SURG SENSICARE PI LF PF 7.5

## (undated) DEVICE — HOOD, PEEL-AWAY: Brand: FLYTE

## (undated) DEVICE — SOL ISO/ALC RUB 70PCT 4OZ

## (undated) DEVICE — FRAZIER SUCTION INSTRUMENT 10 FR W/CONTROL VENT & OBTURATOR: Brand: FRAZIER

## (undated) DEVICE — DUAL CUT SAGITTAL BLADE

## (undated) DEVICE — MAT FLR ABSORBENT LG 4FT 10 2.5FT

## (undated) DEVICE — PK KN TOTL 90

## (undated) DEVICE — NDL SPINE 18G 31/2IN PNK

## (undated) DEVICE — INTENDED FOR TISSUE SEPARATION, AND OTHER PROCEDURES THAT REQUIRE A SHARP SURGICAL BLADE TO PUNCTURE OR CUT.: Brand: BARD-PARKER ® STAINLESS STEEL BLADES

## (undated) DEVICE — 450 ML BOTTLE OF 0.05% CHLORHEXIDINE GLUCONATE IN 99.95% STERILE WATER FOR IRRIGATION, USP AND APPLICATOR.: Brand: IRRISEPT ANTIMICROBIAL WOUND LAVAGE

## (undated) DEVICE — DISPOSABLE TOURNIQUET CUFF SINGLE BLADDER, SINGLE PORT AND QUICK CONNECT CONNECTOR: Brand: COLOR CUFF

## (undated) DEVICE — DRP SURG U/DRP INVISISHIELD PA 48X52IN

## (undated) DEVICE — DISPOSABLE DRAPE, STERILE, FOR A CDS-3072 6 FOOT TABLE: Brand: PEDIGO PRODUCTS, INC.

## (undated) DEVICE — TP SXN YANKR BULB STRL

## (undated) DEVICE — CONN TBG Y 5 IN 1 LF STRL

## (undated) DEVICE — DECANTER BAG 9": Brand: MEDLINE INDUSTRIES, INC.

## (undated) DEVICE — CEMENT MIXING SYSTEM WITH FEMORAL BREAKWAY NOZZLE: Brand: REVOLUTION

## (undated) DEVICE — SUT MNCRYL 2/0 CT1 36IN

## (undated) DEVICE — PREP SOL POVIDONE/IODINE BT 4OZ

## (undated) DEVICE — PCH INST SURG INVISISHIELD 2PCKT